# Patient Record
Sex: FEMALE | Race: WHITE | NOT HISPANIC OR LATINO | Employment: OTHER | URBAN - METROPOLITAN AREA
[De-identification: names, ages, dates, MRNs, and addresses within clinical notes are randomized per-mention and may not be internally consistent; named-entity substitution may affect disease eponyms.]

---

## 2017-05-01 ENCOUNTER — TRANSCRIBE ORDERS (OUTPATIENT)
Dept: LAB | Facility: CLINIC | Age: 67
End: 2017-05-01

## 2017-05-01 DIAGNOSIS — E78.2 MIXED HYPERLIPIDEMIA: Primary | ICD-10-CM

## 2017-05-01 DIAGNOSIS — E78.01 ESSENTIAL FAMILIAL HYPERCHOLESTEROLEMIA: ICD-10-CM

## 2017-08-22 ENCOUNTER — GENERIC CONVERSION - ENCOUNTER (OUTPATIENT)
Dept: OTHER | Facility: OTHER | Age: 67
End: 2017-08-22

## 2017-11-06 ENCOUNTER — ALLSCRIPTS OFFICE VISIT (OUTPATIENT)
Dept: OTHER | Facility: OTHER | Age: 67
End: 2017-11-06

## 2017-11-06 ENCOUNTER — LAB REQUISITION (OUTPATIENT)
Dept: LAB | Facility: HOSPITAL | Age: 67
End: 2017-11-06

## 2017-11-06 DIAGNOSIS — Z01.419 ENCOUNTER FOR GYNECOLOGICAL EXAMINATION WITHOUT ABNORMAL FINDING: ICD-10-CM

## 2017-11-06 PROCEDURE — G0145 SCR C/V CYTO,THINLAYER,RESCR: HCPCS | Performed by: OBSTETRICS & GYNECOLOGY

## 2017-11-07 NOTE — PROGRESS NOTES
Assessment  1  Visit for screening mammogram (V76 12) (Z12 31)   2  Encounter for routine gynecological examination (V72 31) (Z01 419)    Pelvic exam reveals uterus to be anterior mobile normal size and well supported  No ovarian masses are detected  The cervix is normal to appearance  There is no blood or discharge in the vagina  The vaginal mucosa is atrophic  The vulva is normal  Rectal exam shows no blood or masses in the rectum and no nodularity in the cul-de-sac  Breast exam is normal      Plan  Encounter for gynecological examination without abnormal finding    · (1) THIN PREP PAP WITH IMAGING; Status:Active - Retrospective Authorization; Requested QWX:[id number];    Perform:[address]; GDA:[id number]; Last Updated By:Andrew Maza; 11/6/2017 9:50:22 AM;Ordered;For:Encounter for gynecological examination without abnormal finding; Ordered By:Frida Boyce; Maturation index required? : No  :   HPV? : if ASCUS   · * MAMMO SCREENING BILATERAL W CAD; Status:Hold For - Scheduling; Requested  for:[id number]; Perform:[facility]; HIK:[id number];GKYPUUE;ZSZ:YSSHCGNFN for gynecological examination without abnormal finding; Ordered By:Frida Boyce;  Encounter for routine gynecological examination    · Follow-up visit in 2 months Evaluation and Treatment  Follow-up  Status: Hold For -  Scheduling  Requested for: [id number]   Ordered; For: Encounter for routine gynecological examination; Ordered By: Lucio Villalba Performed:  Due: [id number]    A Pap smear was taken at this visit  The patient was given a slip for bilateral screening mammogram to be performed after August 22, 2015  She will return in one year for follow-up GYN evaluation  Discussion/Summary    The patient was told that her breast and pelvic exam are normal  She will call she sees any vaginal bleeding over the next year        Chief Complaint  annual exam      History of Present Illness  HPI: This 19-year-old patient has had no vaginal bleeding or episodes of vaginitis over the past year  She occasionally gets sinus headaches  She has no migraine-type headaches  She has occasional hot flashes  She is in bed from 10:00 at night to 6 in the morning  She has normal bowel and bladder function  She did have a normal pelvic ultrasound in January 2014 which was done because of low back pain  This is resolved since then  She does frequently 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  GYN HM, Adult Female [place]:   General Health:   Lifestyle:  She does not use tobacco -- She denies alcohol use  -- She denies drug use  Reproductive health: the patient is postmenopausal--   she reports no menstrual problems  -- she uses no contraception  -- she is not sexually active  -- pregnancy history: G 2P 2,-- 2    Screening: Cervical cancer screening includes a pap smear performed 11/1/2016  Breast cancer screening includes a mammogram performed 8/22/2017  Colorectal cancer screening includes uncertain timing of the last colonoscopy  Review of Systems    Constitutional: No fever, no chills, feels well, no tiredness, no recent weight gain or loss  ENT: no ear ache, no loss of hearing, no nosebleeds or nasal discharge, no sore throat or hoarseness  Cardiovascular: no complaints of slow or fast heart rate, no chest pain, no palpitations, no leg claudication or lower extremity edema  Respiratory: no complaints of shortness of breath, no wheezing, no dyspnea on exertion, no orthopnea or PND  Breasts: no complaints of breast pain, breast lump or nipple discharge  Gastrointestinal: no complaints of abdominal pain, no constipation, no nausea or diarrhea, no vomiting, no bloody stools  Genitourinary: no complaints of dysuria, no incontinence, no pelvic pain, no dysmenorrhea, no vaginal discharge or abnormal vaginal bleeding     Musculoskeletal: no complaints of arthralgia, no myalgia, no joint swelling or stiffness, no limb pain or swelling  Integumentary: no complaints of skin rash or lesion, no itching or dry skin, no skin wounds  Neurological: no complaints of headache, no confusion, no numbness or tingling, no dizziness or fainting  Active Problems  1  Abdominal pain (789 00) (R10 9)   2  Abdominal pain, LLQ (left lower quadrant) (789 04) (R10 32)   3  Abdominal pain, RLQ (right lower quadrant) (789 03) (R10 31)   4  Encounter for gynecological examination without abnormal finding (V72 31) (Z01 419)   5  Encounter for routine gynecological examination (V72 31) (Z01 419)   6  Encounter for screening mammogram for malignant neoplasm of breast (V76 12)   (Z12 31)   7  Lower back pain (724 2) (M54 5)   8  Lumbago (724 2) (M54 5)   9  Osteopenia (733 90) (M85 80)   10  Visit for screening mammogram (V76 12) (Z12 31)    Past Medical History   · History of Asymptomatic menopausal state (V49 81) (Z78 0)   · History of Inability To Conceive - Female Infertility   · History of Mammogram   · History of Plantar fasciitis (728 71) (M72 2)    Surgical History   · History of  Section   · History of Dilation And Curettage   · History of Endometrial Biopsy By Suction   · History of Exploratory Laparoscopy   · History of Salpingectomy   · History of Shoulder Arthroscopy With Rotator Cuff Repair    Family History  Mother    · Family history of Hypertensive Heart Disease (V17 49)    Social History   · Marital History - Currently    · Never A Smoker    Current Meds   1  Metoprolol Succinate ER 25 MG Oral Tablet Extended Release 24 Hour; Therapy: [id number] to (Last Rx:96Wvy9812)  Requested for: 11Ldb3937 Ordered    Allergies  1   Sulfa Drugs    Vitals   Recorded: 01RLB4169 09:46AM Recorded: 00AGM0021 79:78MZ   Systolic 121 557   Diastolic 66 66   Height 5 ft 1 25 in 5 ft 1 25 in   Weight 140 lb  140 lb    BMI Calculated 26 24 26 24   BSA Calculated 1 63 1 63   LMP       Physical Exam    Constitutional General appearance: No acute distress, well appearing and well nourished  Neck   Neck: Normal, supple, trachea midline, no masses  Thyroid: Normal, no thyromegaly  Pulmonary   Respiratory effort: No increased work of breathing or signs of respiratory distress  Auscultation of lungs: Clear to auscultation  Cardiovascular   Auscultation of heart: Normal rate and rhythm, normal S1 and S2, no murmurs  Peripheral vascular exam: Normal pulses Throughout  Genitourinary   External genitalia: Normal and no lesions appreciated  Vagina: Normal, no lesions or dryness appreciated  Urethra: Normal     Urethral meatus: Normal     Bladder: Normal, soft, non-tender and no prolapse or masses appreciated  Cervix: Normal, no palpable masses  Uterus: Normal, non-tender, not enlarged, and no palpable masses  Adnexa/parametria: Normal, non-tender and no fullness or masses appreciated  Anus, perineum, and rectum: Normal sphincter tone, no masses, and no prolapse  Chest   Breasts: Normal and no dimpling or skin changes noted  Abdomen   Abdomen: Normal, non-tender, and no organomegaly noted  Liver and spleen: No hepatomegaly or splenomegaly  Examination for hernias: No hernias appreciated  Lymphatic   Palpation of lymph nodes in neck, axillae, groin and/or other locations: No lymphadenopathy or masses noted  Skin   Skin and subcutaneous tissue: Normal skin turgor and no rashes      Palpation of skin and subcutaneous tissue: Normal     Psychiatric   Orientation to person, place, and time: Normal     Mood and affect: Normal        Signatures   Electronically signed by : Deborah Taylor MD; Nov 6 2017 10:13AM EST                       (Author)

## 2017-11-14 LAB
LAB AP GYN PRIMARY INTERPRETATION: NORMAL
Lab: NORMAL

## 2017-11-20 ENCOUNTER — GENERIC CONVERSION - ENCOUNTER (OUTPATIENT)
Dept: OTHER | Facility: OTHER | Age: 67
End: 2017-11-20

## 2018-01-12 VITALS
DIASTOLIC BLOOD PRESSURE: 66 MMHG | BODY MASS INDEX: 26.43 KG/M2 | SYSTOLIC BLOOD PRESSURE: 118 MMHG | WEIGHT: 140 LBS | HEIGHT: 61 IN

## 2018-01-12 NOTE — RESULT NOTES
Verified Results  (B) PAP (REFLEX TO HPV PLUS WHEN ASC-US) [id number] 10:23AM Arik Charlton     Test Name Result Flag Reference   PAP, LIQUID-BASED NILM     DIAGNOSIS:            Negative for intraepithelial lesion or malignancy  ADEQUACY:             Satisfactory for evaluation /                         Endocervical/transformation zone component                         present  Transformation zone component cannot be                         adequately evaluated due to atrophic smear  COMMENT:              This Pap smear was screened with the assistance                         of the [employer] SharedPrep(TM) Imaging System and                         screened by a cytotechnologist   SPECIMEN SOURCE:      PAP (RFLX HPV PLUS WHENASC-US), CERVIX  CLINICAL INFORMATION: LMP: N/A                        Post menopausal                        Provided Diagnosis Codes: P97 706                                                Cervicovaginal cytology should be considered a                         screening procedure subject to false negatives                         and false positives  Results are more reliable                         when a satisfactory sample is obtained on a                         regular repetitive basis, and should be                         interpreted together with past and current                         clinical data    ELECTRONICALLY SIGNED   BY:                   Screened By: AYE Segura (ASCP)   Case                         Electronically Signed 11/03/2016

## 2018-01-18 NOTE — RESULT NOTES
Verified Results  (1) THIN PREP PAP WITH IMAGING [id number] 04:41PM Melissa Powell     Test Name Result Flag Reference   LAB AP CASE REPORT (Report)     Gynecologic Cytology Report            Case: [id number]                  Authorizing Provider: Hillary Amaro MD     Collected:      11/06/2017           First Screen:     AYE Selby    Received:      11/07/2017 1006        Rescreen:       Lawton Goldmann, CT                             Specimen:  LIQUID-BASED PAP, SCREENING, Cervix   LAB AP GYN PRIMARY INTERPRETATION      Negative for intraepithelial lesion or malignancy  Electronically signed by Lawton Goldmann, CT on 11/14/2017 at 4:41 PM   LAB AP GYN SPECIMEN ADEQUACY      Satisfactory for evaluation  (See note)   LAB AP GYN NOTE      No endocervical cells identified  It is difficult to differentiate between   squamous metaplastic cells and parabasal cells in atrophic specimens due   to numerous causes including menopause, postpartum changes and   progestational agents  LAB AP GYN ADDITIONAL INFORMATION (Report)     [facility]'s FDA approved ,  and ThinPrep Imaging System are   utilized with strict adherence to the 's instruction manual to   prepare gynecologic and non-gynecologic cytology specimens for the   production of ThinPrep slides as well as for gynecologic ThinPrep imaging  These processes have been validated by our laboratory and/or by the     The Pap test is not a diagnostic procedure and should not be used as the   sole means to detect cervical cancer  It is only a screening procedure to   aid in the detection of cervical cancer and its precursors  Both   false-negative and false-positive results have been experienced  Your   patient's test result should be interpreted in this context together with   the history and clinical findings     LAB AP LMP

## 2018-03-10 ENCOUNTER — HOSPITAL ENCOUNTER (EMERGENCY)
Facility: HOSPITAL | Age: 68
Discharge: HOME | End: 2018-03-10
Attending: EMERGENCY MEDICINE | Admitting: EMERGENCY MEDICINE
Payer: COMMERCIAL

## 2018-03-10 ENCOUNTER — APPOINTMENT (EMERGENCY)
Dept: RADIOLOGY | Facility: HOSPITAL | Age: 68
End: 2018-03-10
Payer: COMMERCIAL

## 2018-03-10 VITALS
OXYGEN SATURATION: 100 % | RESPIRATION RATE: 18 BRPM | HEART RATE: 68 BPM | BODY MASS INDEX: 26.86 KG/M2 | WEIGHT: 143.3 LBS | DIASTOLIC BLOOD PRESSURE: 80 MMHG | SYSTOLIC BLOOD PRESSURE: 175 MMHG | TEMPERATURE: 98.2 F

## 2018-03-10 DIAGNOSIS — T14.8XXA HEMATOMA: Primary | ICD-10-CM

## 2018-03-10 PROCEDURE — 99283 EMERGENCY DEPT VISIT LOW MDM: CPT

## 2018-03-10 PROCEDURE — 73090 X-RAY EXAM OF FOREARM: CPT

## 2018-03-11 NOTE — DISCHARGE INSTRUCTIONS
Hematoma    WHAT YOU NEED TO KNOW:    A hematoma is a collection of blood  A bruise is a type of hematoma  A hematoma may form in a muscle or in the tissues just under the skin  A hematoma that forms under the skin will feel like a bump or hard mass  Hematomas can happen anywhere in your body, including in your brain  Your body may break down and absorb a mild hematoma on its own  A more serious hematoma may need treatment  DISCHARGE INSTRUCTIONS:    Medicines: You may need any of the following:    Prescription pain medicine may be given  Ask how to take this medicine safely  NSAIDs , such as ibuprofen, help decrease swelling, pain, and fever  This medicine is available with or without a doctor's order  NSAIDs can cause stomach bleeding or kidney problems in certain people  If you take blood thinner medicine, always ask your healthcare provider if NSAIDs are safe for you  Always read the medicine label and follow directions  Antibiotics prevent or treat a bacterial infection  Take your medicine as directed  Call your healthcare provider if you think your medicine is not helping or if you have side effects  Tell him if you are allergic to any medicine  Keep a list of the medicines, vitamins, and herbs you take  Include the amounts, and when and why you take them  Bring the list or the pill bottles to follow-up visits  Carry your medicine list with you in case of an emergency  Return to the emergency department if:  You have new or worsening pain, or pain that does not get better with medicine  You have a fever  You have trouble moving the body part that has the hematoma  Contact your healthcare provider if:  You have questions or concerns about your condition or care  Follow up with your healthcare provider as directed: You may need to have surgery if your hematoma is severe  You may also need other tests to make sure there is no other damage that needs to be treated   Write down your questions so you remember to ask them during your visits  Self-care:  Rest the area  Rest will help your body heal and will also help prevent more damage  Apply ice as directed  Ice helps reduce swelling  Ice may also help prevent tissue damage  Use an ice pack, or put crushed ice in a bag  Cover it with a towel  Place it on your hematoma for 20 minutes every hour, or as directed  Ask how many times each day to apply ice, and for how many days  Compress the injury if possible  Lightly wrap the injury with an elastic or soft bandage  This may help control swelling  Ask your healthcare provider how to wrap your injury properly  Elevate the area as directed  If possible, raise the area above the level of your heart as often as you can  This will help decrease swelling  Keep the hematoma covered with a bandage  This will help protect the area while it heals  Contusion in Adults   WHAT YOU NEED TO KNOW:   A contusion is a bruise that appears on your skin after an injury  A bruise happens when small blood vessels tear but skin does not  When blood vessels tear, blood leaks into nearby tissue, such as soft tissue or muscle  DISCHARGE INSTRUCTIONS:   Return to the emergency department if:   · You have new trouble moving the injured area  · You have tingling or numbness in or near the injured area  · Your hand or foot below the bruise gets cold or turns pale  Contact your healthcare provider if:   · You find a new lump in the injured area  · Your symptoms do not improve with treatment after 4 to 5 days  · You have questions or concerns about your condition or care  Medicines: You may need any of the following:  · NSAIDs  help decrease swelling and pain or fever  This medicine is available with or without a doctor's order  NSAIDs can cause stomach bleeding or kidney problems in certain people  If you take blood thinner medicine, always ask your healthcare provider if NSAIDs are safe for you  Always read the medicine label and follow directions  · Prescription pain medicine  may be given  Do not wait until the pain is severe before you take your medicine  · Take your medicine as directed  Contact your healthcare provider if you think your medicine is not helping or if you have side effects  Tell him of her if you are allergic to any medicine  Keep a list of the medicines, vitamins, and herbs you take  Include the amounts, and when and why you take them  Bring the list or the pill bottles to follow-up visits  Carry your medicine list with you in case of an emergency  Follow up with your healthcare provider as directed: You may need to return within a week to check your injury again  Write down your questions so you remember to ask them during your visits  Help a contusion heal:   · Rest the injured area  or use it less than usual  If you bruised your leg or foot, you may need crutches or a cane to help you walk  This will help you keep weight off your injured body part  · Apply ice  to decrease swelling and pain  Ice may also help prevent tissue damage  Use an ice pack, or put crushed ice in a plastic bag  Cover it with a towel and place it on your bruise for 15 to 20 minutes every hour or as directed  · Use compression  to support the area and decrease swelling  Wrap an elastic bandage around the area over the bruised muscle  Make sure the bandage is not too tight  You should be able to fit 1 finger between the bandage and your skin  · Elevate (raise) your injured body part  above the level of your heart to help decrease pain and swelling  Use pillows, blankets, or rolled towels to elevate the area as often as you can  · Do not drink alcohol  as directed  Alcohol may slow healing  · Do not stretch injured muscles  right after your injury  Ask your healthcare provider when and how you may safely stretch after your injury   Gentle stretches can help increase your flexibility  · Do not massage the area or put heating pads  on the bruise right after your injury  Heat and massage may slow healing  Your healthcare provider may tell you to apply heat after several days  At that time, heat will start to help the injury heal   Prevent another contusion:   · Stretch and warm up before you play sports or exercise  · Wear protective gear when you play sports  Examples are shin guards and padding  · If you begin a new physical activity, start slowly to give your body a chance to adjust   © 2017 [address]  Information is for End User's use only and may not be sold, redistributed or otherwise used for commercial purposes  All illustrations and images included in CareNotes® are the copyrighted property of A D A M , Inc  or [employer]  The above information is an  only  It is not intended as medical advice for individual conditions or treatments  Talk to your doctor, nurse or pharmacist before following any medical regimen to see if it is safe and effective for you

## 2018-03-11 NOTE — ED PROVIDER NOTES
History  Chief Complaint   Patient presents with    Arm Injury     pt reprts falling this evening attempting to move a matress  pt reports hitting R arm on chair  denies LOC or hitting head  Pt repots taking 400mg ibuprofen PTA     70-year-old female presents with chief complaint of pain in her right forearm  Patient was moving a mattress earlier today when she hit her right forearm on a chair  Patient has some mild pain but is more concerned because she has some significant swelling at the site of injury  Patient has a superficial abrasion there as well  Patient states that her grandfather  from a hematoma that traveled to his heart  This is were concerned her because she clearly has an obvious hematoma to her right forearm  Patient does not take any blood thinners but did take 400 mg of ibuprofen prior to arrival         History provided by:  Patient   used: No    Arm Injury   Location:  Arm  Arm location:  R forearm  Injury: yes    Time since incident:  1 hour  Mechanism of injury comment:  Struck arm against right forearm  Pain details:     Quality:  Aching    Severity:  Mild    Onset quality:  Gradual    Timing:  Constant  Handedness:  Right-handed  Foreign body present:  No foreign bodies  Tetanus status:  Up to date  Prior injury to area:  No  Relieved by: Ice  Worsened by:  Nothing  Associated symptoms: no fever        None       Past Medical History:   Diagnosis Date    Mitral valve prolapse     "cardiologist reports not any more"       Past Surgical History:   Procedure Laterality Date    ROTATOR CUFF REPAIR Right        History reviewed  No pertinent family history  I have reviewed and agree with the history as documented  Social History   Substance Use Topics    Smoking status: Never Smoker    Smokeless tobacco: Not on file    Alcohol use No        Review of Systems   Constitutional: Negative for chills and fever     Musculoskeletal: Positive for arthralgias (right forearm)  Skin: Positive for wound (superficial clean abrasion)  Neurological: Negative for weakness and numbness  Physical Exam  ED Triage Vitals   Temperature Pulse Respirations Blood Pressure SpO2   03/10/18 2106 03/10/18 2102 03/10/18 2102 03/10/18 2102 03/10/18 2102   98 2 °F (36 8 °C) 68 18 (!) 175/80 100 %      Temp Source Heart Rate Source Patient Position - Orthostatic VS BP Location FiO2 (%)   03/10/18 2106 03/10/18 2102 03/10/18 2102 03/10/18 2102 --   Oral Monitor Lying Right arm       Pain Score       03/10/18 2102       2           Orthostatic Vital Signs  Vitals:    03/10/18 2102   BP: (!) 175/80   Pulse: 68   Patient Position - Orthostatic VS: Lying       Physical Exam   Constitutional: She is oriented to person, place, and time  She appears well-developed and well-nourished  She appears distressed (mild)  HENT:   Head: Normocephalic and atraumatic  Eyes: EOM are normal  Pupils are equal, round, and reactive to light  Neck: Normal range of motion  No JVD present  Cardiovascular: Normal rate, regular rhythm, normal heart sounds and intact distal pulses  Exam reveals no gallop and no friction rub  No murmur heard  Pulmonary/Chest: Effort normal and breath sounds normal  No respiratory distress  She has no wheezes  She has no rales  She exhibits no tenderness  Musculoskeletal: Normal range of motion  She exhibits edema and tenderness (right forearm)  Neurological: She is alert and oriented to person, place, and time  Skin: Skin is warm and dry  Psychiatric: She has a normal mood and affect  Her behavior is normal  Judgment and thought content normal    Nursing note and vitals reviewed  ED Medications  Medications - No data to display    Diagnostic Studies  Results Reviewed     None                 XR forearm 2 views RIGHT   ED Interpretation by Jez Simpson MD (03/10 2135)   This film was interpreted independently by me     Soft tissue swelling consistent with hematoma otw no fracture or dislocation  Procedures  Procedures       Phone Contacts  ED Phone Contact    ED Course  ED Course                                MDM  Number of Diagnoses or Management Options  Hematoma: new and requires workup  Diagnosis management comments: Background: 79 y o  female with right forearm pain after injury    Differential DX includes but is not limited to: fracture vs contusion vs sprain     Plan: imaging, symptom control         Amount and/or Complexity of Data Reviewed  Tests in the radiology section of CPT®: ordered and reviewed  Independent visualization of images, tracings, or specimens: yes      CritCare Time    Disposition  Final diagnoses:   Hematoma - acute right forearm     Time reflects when diagnosis was documented in both MDM as applicable and the Disposition within this note     Time User Action Codes Description Comment    3/10/2018  9:35 PM Faustino Silva  8XXA] Hematoma     3/10/2018  9:35 PM Eren Mendoza  8XXA] Hematoma acute right forearm      ED Disposition     ED Disposition Condition Comment    Discharge  Paralee Plater discharge to home/self care  Condition at discharge: Good        Follow-up Information    None       There are no discharge medications for this patient  No discharge procedures on file      ED Provider  Electronically Signed by           Lissett Corea MD  03/10/18 4236

## 2018-08-22 DIAGNOSIS — Z01.419 ENCOUNTER FOR GYNECOLOGICAL EXAMINATION WITHOUT ABNORMAL FINDING: ICD-10-CM

## 2018-11-12 ENCOUNTER — ANNUAL EXAM (OUTPATIENT)
Dept: OBGYN CLINIC | Facility: CLINIC | Age: 68
End: 2018-11-12
Payer: COMMERCIAL

## 2018-11-12 VITALS
SYSTOLIC BLOOD PRESSURE: 128 MMHG | HEIGHT: 61 IN | WEIGHT: 138 LBS | DIASTOLIC BLOOD PRESSURE: 76 MMHG | BODY MASS INDEX: 26.06 KG/M2

## 2018-11-12 DIAGNOSIS — Z12.31 ENCOUNTER FOR SCREENING MAMMOGRAM FOR MALIGNANT NEOPLASM OF BREAST: Primary | ICD-10-CM

## 2018-11-12 DIAGNOSIS — Z01.419 ENCOUNTER FOR GYNECOLOGICAL EXAMINATION (GENERAL) (ROUTINE) WITHOUT ABNORMAL FINDINGS: ICD-10-CM

## 2018-11-12 PROCEDURE — G0145 SCR C/V CYTO,THINLAYER,RESCR: HCPCS | Performed by: OBSTETRICS & GYNECOLOGY

## 2018-11-12 PROCEDURE — 99397 PER PM REEVAL EST PAT 65+ YR: CPT | Performed by: OBSTETRICS & GYNECOLOGY

## 2018-11-12 RX ORDER — METOPROLOL SUCCINATE 25 MG/1
25 TABLET, EXTENDED RELEASE ORAL
COMMUNITY
Start: 2018-07-24

## 2018-11-12 RX ORDER — AMPICILLIN TRIHYDRATE 250 MG
600 CAPSULE ORAL
COMMUNITY
Start: 2018-01-29

## 2018-11-12 RX ORDER — ASPIRIN 81 MG/1
81 TABLET, CHEWABLE ORAL
COMMUNITY
Start: 2018-08-02 | End: 2019-11-14

## 2018-11-12 NOTE — PATIENT INSTRUCTIONS
This 41-year-old patient was told that her breast and pelvic exam are normal  She will call if she sees any vaginal bleeding over the next year

## 2018-11-12 NOTE — PROGRESS NOTES
Assessment/Plan: This 72-year-old patient is seen today for annual gyn evaluation  She has no specific complaints at this time  No problem-specific Assessment & Plan notes found for this encounter  Subjective:      Patient ID: Jacklyn Aguilera is a 76 y o  female  This 72-year-old patient has had no vaginal bleeding or episodes of vaginitis over the past year  Vaginal intercourse is not occurring at this time  She has no hot flashes chronic fainting spells or headaches  She sleeps about 7 0 5 hours at night  Her bowel function is sometimes associated with bleeding due to hemorrhoids  She did have a normal colonoscopy within the past few years  She does not wear liners or pads for urine loss  She has had no urinary tract infections over the past year or kidney stones  She still baby-sits for 3 grandchildren on a regular basis and cooks for four  families  Gynecologic Exam           Review of Systems   Constitutional: Negative  HENT: Negative  Eyes: Negative  Respiratory: Negative  Cardiovascular: Negative  Gastrointestinal: Negative  Endocrine: Negative  Genitourinary: Negative  Musculoskeletal: Negative  Skin: Negative  Allergic/Immunologic: Negative  Neurological: Negative  Hematological: Negative  Psychiatric/Behavioral: Negative  Objective:      /76 (BP Location: Left arm, Patient Position: Sitting, Cuff Size: Standard)   Ht 5' 1" (1 549 m)   Wt 62 6 kg (138 lb)   BMI 26 07 kg/m²          Physical Exam   Constitutional: She is oriented to person, place, and time  She appears well-developed and well-nourished  HENT:   Head: Normocephalic  Neck: Normal range of motion  Neck supple  Cardiovascular: Normal rate, regular rhythm, normal heart sounds and intact distal pulses  Pulmonary/Chest: Effort normal and breath sounds normal    Abdominal: Soft   Bowel sounds are normal    Genitourinary: Uterus normal    Musculoskeletal: Normal range of motion  Neurological: She is alert and oriented to person, place, and time  Skin: Skin is warm and dry  Psychiatric: She has a normal mood and affect  Nursing note and vitals reviewed  breast exam is normal   Pelvic exam reveals uterus to be anterior well supported normal size  No adnexal masses are identified  There is no blood or discharge in the vagina  The vulva is normal  Rectal exam shows no blood or  masses in the rectum and no nodularity in the cul-de-sac

## 2018-11-19 LAB
LAB AP GYN PRIMARY INTERPRETATION: NORMAL
Lab: NORMAL

## 2019-08-07 PROBLEM — I83.893 SYMPTOMATIC VARICOSE VEINS OF BOTH LOWER EXTREMITIES: Status: ACTIVE | Noted: 2019-08-07

## 2019-08-07 NOTE — ASSESSMENT & PLAN NOTE
67yo female with PMH hypercholesterolemia, mitral valve prolapse, and symptomatic varicose veins presents to the Vascular office today for evaluation of bilateral symptomatic veins  Telangiectasias and spider veins to both legs; no swelling noted  Symptoms of heavy, tired, aching legs with burning to both legs occurs regularly  We discussed the pathophysiology of varicose veins and venous insufficiency  Patient expressed understanding  Will start with conservative measures to aid in symptom relief and progression of disease      PLAN:  -compression stockings 20-30mmHg Rx given, to be worn during waking hours and removed at bedtime  -elevate legs throughout the day as able  -exercise daily as tolerated  -continue weight loss and low-fat low-chol, low-sodium diet  -LEVDR in 3 months  -return for f/u with Vascular Surgeon after testing to discuss treatment plan  -if you have any questions or concerns, please call our office to discuss

## 2019-08-08 RX ORDER — PNV NO.95/FERROUS FUM/FOLIC AC 28MG-0.8MG
1 TABLET ORAL DAILY
COMMUNITY
Start: 2018-01-29

## 2019-08-12 ENCOUNTER — CONSULT (OUTPATIENT)
Dept: VASCULAR SURGERY | Facility: CLINIC | Age: 69
End: 2019-08-12
Payer: COMMERCIAL

## 2019-08-12 VITALS
TEMPERATURE: 97.8 F | DIASTOLIC BLOOD PRESSURE: 66 MMHG | HEIGHT: 61 IN | SYSTOLIC BLOOD PRESSURE: 110 MMHG | HEART RATE: 67 BPM | BODY MASS INDEX: 27.19 KG/M2 | WEIGHT: 144 LBS

## 2019-08-12 DIAGNOSIS — I87.2 VENOUS INSUFFICIENCY (CHRONIC) (PERIPHERAL): Primary | ICD-10-CM

## 2019-08-12 DIAGNOSIS — I83.893 SYMPTOMATIC VARICOSE VEINS OF BOTH LOWER EXTREMITIES: ICD-10-CM

## 2019-08-12 PROCEDURE — 99204 OFFICE O/P NEW MOD 45 MIN: CPT | Performed by: NURSE PRACTITIONER

## 2019-08-12 RX ORDER — MULTIVITAMIN
1 TABLET ORAL DAILY
COMMUNITY

## 2019-08-12 NOTE — PROGRESS NOTES
Assessment/Plan:    Symptomatic varicose veins of both lower extremities  65yo female with PMH hypercholesterolemia, mitral valve prolapse, and symptomatic varicose veins presents to the Vascular office today for evaluation of bilateral symptomatic veins  Telangiectasias and spider veins to both legs; no swelling noted  Symptoms of heavy, tired, aching legs with burning to both legs occurs regularly  We discussed the pathophysiology of varicose veins and venous insufficiency  Patient expressed understanding  Will start with conservative measures to aid in symptom relief and progression of disease  PLAN:  -compression stockings 20-30mmHg Rx given, to be worn during waking hours and removed at bedtime  -elevate legs throughout the day as able  -exercise daily as tolerated  -continue weight loss and low-fat low-chol, low-sodium diet  -LEVDR in 3 months  -return for f/u with Vascular Surgeon after testing to discuss treatment plan  -if you have any questions or concerns, please call our office to discuss         Diagnoses and all orders for this visit:    Venous insufficiency (chronic) (peripheral)  -     Compression Stocking  -     VAS reflux lower limb venous duplex study with reflux assessment, complete bilateral; Future    Symptomatic varicose veins of both lower extremities    Other orders  -     Multiple Vitamin (MULTIVITAMIN) tablet; Take 1 tablet by mouth daily          Subjective:      Patient ID: Teresa Adan is a 76 y o  female  Pt is new to our office  She is here today to have the varicose veins in her legs checked  She says that she first noticed the veins in her legs at age 39  She c/o aching, tiredness and heaviness in her legs  Pt does c/o of spider veins on bilateral legs  She denies any leg swelling, bleeding veins or any open wounds  She denies wearing compression stockings and exercise  She does elevate her legs  Pt takes ASA 81 mg every other day      Emilia Ray is a 65yo female with PMH hypercholesterolemia, mitral valve prolapse, and symptomatic varicose veins who presents to the Vascular office today for evaluation of bilateral symptomatic veins  She has telangiectasias and spider veins to both legs; no swelling noted  She reports symptoms of heavy, tired, aching legs with burning to both legs occurs regularly  This has been since age 39, worsening over the past few years  She noticed a "popping" sensation to the left posterior knee recently with aching  Went to Ortho but no diagnosis made  She denies any history of phlebitis, DVT/SVT, denies itching, wounds, ulcerations to the legs  Her grandmother had varicose veins, otherwise no significant FH  She is a non-smoker  She exercises regularly and elevates her legs at night  Not currently wearing compression stockings  The following portions of the patient's history were reviewed and updated as appropriate: allergies, current medications, past family history, past medical history, past social history, past surgical history and problem list     Review of Systems   Constitutional: Negative  HENT: Negative  Eyes: Negative  Respiratory: Negative  Cardiovascular: Negative  Gastrointestinal: Negative  Endocrine: Negative  Genitourinary: Negative  Musculoskeletal: Negative  Skin: Negative  Allergic/Immunologic: Negative  Neurological: Negative  Hematological: Bruises/bleeds easily  Psychiatric/Behavioral: Negative  Objective:      /66 (BP Location: Right arm, Patient Position: Sitting, Cuff Size: Standard)   Pulse 67   Temp 97 8 °F (36 6 °C) (Tympanic)   Ht 5' 1" (1 549 m)   Wt 65 3 kg (144 lb)   BMI 27 21 kg/m²          Physical Exam   Constitutional: She is oriented to person, place, and time  She appears well-developed and well-nourished  HENT:   Head: Normocephalic and atraumatic  Eyes: Pupils are equal, round, and reactive to light   EOM are normal  No scleral icterus  Neck: Normal range of motion  Cardiovascular: Normal rate, regular rhythm, normal heart sounds and intact distal pulses  Pulmonary/Chest: Effort normal and breath sounds normal    Abdominal: Soft  Bowel sounds are normal    Musculoskeletal: Normal range of motion  Neurological: She is alert and oriented to person, place, and time  She has normal strength  Skin: Skin is warm, dry and intact  Capillary refill takes less than 2 seconds  Psychiatric: She has a normal mood and affect  Her speech is normal and behavior is normal  Judgment and thought content normal  Cognition and memory are normal    Nursing note and vitals reviewed  I have reviewed and made appropriate changes to the review of systems input by the medical assistant  Vitals:    08/12/19 0859   BP: 110/66   BP Location: Right arm   Patient Position: Sitting   Cuff Size: Standard   Pulse: 67   Temp: 97 8 °F (36 6 °C)   TempSrc: Tympanic   Weight: 65 3 kg (144 lb)   Height: 5' 1" (1 549 m)       Patient Active Problem List   Diagnosis    Symptomatic varicose veins of both lower extremities    Venous insufficiency (chronic) (peripheral)       Past Surgical History:   Procedure Laterality Date    ROTATOR CUFF REPAIR Right        Family History   Problem Relation Age of Onset    Varicose Veins Maternal Grandmother        Social History     Socioeconomic History    Marital status: /Civil Union     Spouse name: Not on file    Number of children: Not on file    Years of education: Not on file    Highest education level: Not on file   Occupational History    Occupation: retired   Social Needs    Financial resource strain: Not on file    Food insecurity:     Worry: Not on file     Inability: Not on file   [employer] needs:     Medical: Not on file     Non-medical: Not on file   Tobacco Use    Smoking status: Never Smoker    Smokeless tobacco: Never Used   Substance and Sexual Activity    Alcohol use:  No  Drug use: No    Sexual activity: Never   Lifestyle    Physical activity:     Days per week: Not on file     Minutes per session: Not on file    Stress: Not on file   Relationships    Social connections:     Talks on phone: Not on file     Gets together: Not on file     Attends [employer] service: Not on file     Active member of club or organization: Not on file     Attends meetings of clubs or organizations: Not on file     Relationship status: Not on file    Intimate partner violence:     Fear of current or ex partner: Not on file     Emotionally abused: Not on file     Physically abused: Not on file     Forced sexual activity: Not on file   Other Topics Concern    Not on file   Social History Narrative    Not on file       Allergies   Allergen Reactions    Statins      DIZZY    Sulfa Antibiotics     Ezetimibe GI Intolerance         Current Outpatient Medications:     aspirin 81 mg chewable tablet, Chew 81 mg, Disp: , Rfl:     metoprolol succinate (TOPROL-XL) 25 mg 24 hr tablet, Take 25 mg by mouth, Disp: , Rfl:     Multiple Vitamin (MULTIVITAMIN) tablet, Take 1 tablet by mouth daily, Disp: , Rfl:     Omega-3 Fatty Acids (FISH OIL OMEGA-3) 1000 MG CAPS, Take 1 g by mouth daily, Disp: , Rfl:     Red Yeast Rice 600 MG CAPS, Take 600 mg by mouth, Disp: , Rfl:     metroNIDAZOLE (NORITRATE) 1 % cream, Apply topically, Disp: , Rfl:

## 2019-08-12 NOTE — PATIENT INSTRUCTIONS
We discussed the pathophysiology of varicose veins and venous insufficiency  Will start with conservative measures to aid in symptom relief and progression of disease  PLAN:  -compression stockings 20-30mmHg Rx given, to be worn during waking hours and removed at bedtime  -elevate legs throughout the day as able  -exercise daily as tolerated  -continue with healthy lifestyle including low-fat low-chol, low-sodium diet  -LEVDR in 3 months  -return for f/u with Vascular Surgeon after testing to discuss treatment plan  -if you have any questions or concerns, please call our office to discuss        Varicose Veins   AMBULATORY CARE:   Varicose veins  are veins that become large, twisted, and swollen  They are common on the back of the calves, knees, and thighs  Varicose veins are caused by valves in your veins that do not work properly  This causes blood to collect and increase pressure in the veins of your legs  The increased pressure causes your veins to stretch, get larger, swell, and twist        Common symptoms include the following: Your symptoms may be worse after you stand or sit for long periods of time  You may have any of the following:  · Blue, purple, or bulging veins in your legs     · Pain, swelling, or muscle cramps in your legs    · Feeling of fatigue or heaviness in your legs    · Cramping in your legs  Seek care immediately if:   · You have a wound that does not heal or is infected  · You have an injury that has broken your skin and caused your varicose veins to bleed  · Your leg is swollen and hard  · You notice that your legs or feet are turning blue or black  · Your leg feels warm, tender, and painful  It may look swollen and red  Contact your healthcare provider if:   · You have pain in your leg that does not go away or gets worse  · You notice sudden large bruising on your legs  · You have a rash on your leg       · Your symptoms keep you from doing your daily activities  · You have questions or concerns about your condition or care  Treatment of varicose veins  aims to decrease symptoms, improve appearance, and prevent further problems  Treatment will depend on which veins are affected and how severe your condition is  You may need procedures to treat or remove your varicose veins  For example, your healthcare provider may inject a solution or use a laser to close the varicose veins  Surgery to remove long veins may also be done  Ask your healthcare provider for more information about procedures used to treat varicose veins  Manage varicose veins:   · Do not sit or stand for long periods of time  This can cause the blood to collect in your legs and make your symptoms worse  Bend or rotate your ankles several times every hour  Walk around for a few minutes every hour to get blood moving in your legs  · Do not cross your legs when you sit  This decreases blood flow to your feet and can make your symptoms worse  · Do not wear tight clothing or shoes  Do not wear high-heeled shoes  Do not wear clothes that are tight around the waist or knees  · Maintain a healthy weight  Being overweight or obese can make your varicose veins worse  Ask your healthcare provider how much you should weigh  Ask him or her to help you create a weight loss plan if you are overweight  · Wear pressure stockings as directed  The stockings are tight and put pressure on your legs  They improve blood flow and help prevent clots  · Elevate your legs  Keep them above the level of your heart for 15 to 30 minutes several times a day  You can also prop the end of your bed up slightly to elevate your legs while you sleep  This will help blood to flow back to your heart  · Get regular exercise  Talk to your healthcare provider about the best exercise plan for you  Exercise can improve blood flow to your legs and feet    Follow up with your healthcare provider as directed: Write down your questions so you remember to ask them during your visits  © 2017 [employer] Information is for End User's use only and may not be sold, redistributed or otherwise used for commercial purposes  All illustrations and images included in CareNotes® are the copyrighted property of [employer]  or [employer]o  The above information is an  only  It is not intended as medical advice for individual conditions or treatments  Talk to your doctor, nurse or pharmacist before following any medical regimen to see if it is safe and effective for you  Venous Insufficiency   WHAT YOU NEED TO KNOW:   What is venous insufficiency? Venous insufficiency is a condition that prevents blood from flowing out of your legs and back to your heart  Veins contain valves that help blood flow in one direction  Venous insufficiency means the valves do not close correctly or fully  Blood flows back and pools in your leg  This can cause problems such as varicose veins  Venous insufficiency may also be called chronic venous insufficiency or venous stasis  What increases my risk for venous insufficiency? · A leg injury or blood clot    · Being a woman    · Pregnancy    · Older age    · A family history of varicose veins    · Smoking cigarettes    · Obesity, or not getting enough exercise  What are the signs and symptoms of venous insufficiency? · Visible veins on your legs that may be small and red or large, thick, and blue    · Swelling in your ankles or calves    · Changes in skin color, such as dark or purple skin    · An ulcer (open sore) on your leg    · Leg pain that is worse when you are menstruating (women) or when you stand, and better when you elevate your legs    · Burning or itching    · Cramps that happen at night    · Thick, hard skin on your legs and ankles    · Feeling of heaviness in your legs  How is venous insufficiency diagnosed?    · Venous duplex imaging  is a procedure used to examine the blood flow through veins  A gel will be applied to your legs  Your healthcare provider will slide a small device called a transducer across the veins  The transducer is a microphone that helps your healthcare provider hear blood moving through the vein  · Contrast venography  is a procedure used to show the veins on x-ray pictures  A catheter is guided into the vein  Contrast liquid is injected into the catheter to help the veins show up better in the pictures  Tell the healthcare provider if you have ever had an allergic reaction to contrast liquid  · Plethysmography  is a procedure that may be used to find changes in blood pressure through your veins  You will wear a blood pressure cuff on your leg  Changes in pressure and the amount of blood that can circulate through your leg veins are measured  Pressures are measured while you stand, sit, and lift your leg  How is venous insufficiency treated? · Medicine  may be given to improve blood flow  The medicines may thin your blood or reduce swelling to help blood flow  You may also need medicine to treat a bacterial infection  · Ablation  is a procedure used to close varicose veins  A catheter is guided until it is near the vein  A device will then be guided to the area  The device may produce energy through radiofrequency or a laser  The energy creates heat that will close the blood vessel  · Sclerotherapy  is a procedure used to fade visible veins  Your healthcare provider will inject a liquid into a spider vein or varicose vein  The liquid causes irritation in the vein  The vein swells and sticks together  Your body will then absorb the vein  · Surgery  may be needed if other treatments do not work  Surgery may be used to repair a leg vein valve or to clip or tie off a vein so blood cannot flow through it  You may need to have a veins removed during surgery called stripping   Surgery may be used to bypass (go around) the damaged vein  Blood will flow through a vein transplanted from another part of your body  What can I do to manage my symptoms? · Wear pressure stockings as directed  Pressure stockings help keep blood from pooling in your leg veins  Your healthcare provider can prescribe stockings that are right for you  Do not buy over-the-counter pressure stockings unless your healthcare provider says it is okay  They may not fit correctly or may have elastic that cuts off your circulation  Ask your healthcare provider when to start wearing pressure stockings and how long to wear them each day  · Do not sit or stand for long periods of time  If you have to sit for a long time, flex and extend your legs, feet, and ankles  Do this about 10 times every 30 minutes to help keep blood flowing  If you have to stand for a long time, take breaks and sit with your legs elevated  · Elevate your legs  Elevate your legs above the level of your heart to reduce swelling  Your healthcare provider may recommend that you keep your legs elevated for 30 minutes at a time  You may need to do this 3 to 4 times per day, or more if your healthcare provider recommends  · Do not smoke  Nicotine and other chemicals in cigarettes and cigars can cause blood vessel damage  Ask your healthcare provider for information if you currently smoke and need help to quit  E-cigarettes or smokeless tobacco still contain nicotine  Talk to your healthcare provider before you use these products  · Reach or maintain a healthy weight  Extra weight can make venous insufficiency worse  Ask your healthcare provider how much you should weigh  He can help you create a weight loss plan if you need to lose weight  · Exercise as directed  Walking can help increase blood flow in your calves  Ask your healthcare provider how much exercise you need each day and which exercises are best for you  · Care for your skin  Keep your skin clean  Do not use any soaps or lotions that may dry your skin  For example, do not use products that contain fragrance or alcohol  If you have a skin ulcer, your healthcare provider may recommend a wet-to-dry bandage  To do this, apply a wet bandage to your wound and allow it to dry  This will help remove drainage from your wound each time you change the bandage  Your healthcare provider will tell you how often to change your bandage and which kind of bandage to use  Check your wound for signs of infection, such as swelling or pus  · Go to physical therapy (PT) as directed  A physical therapist can help you increase movement and range of motion in your legs  When should I seek immediate care? · You have a wound that does not heal or is infected  · You have an injury that has broken your skin and caused your varicose veins to bleed  · Your leg is swollen and hard  · You have pain in your leg that does not go away or gets worse  · Your legs or feet are turning blue or black  · Your leg feels warm, tender, and painful  It may look swollen and red  When should I contact my healthcare provider? · You have a fever  · You have varicose veins and they are painful  · You have new or worsening leg pain, swelling, or redness  · You have new or worsening ulcers or other sores on your leg  · You have questions or concerns about your condition or care  CARE AGREEMENT:   You have the right to help plan your care  Learn about your health condition and how it may be treated  Discuss treatment options with your caregivers to decide what care you want to receive  You always have the right to refuse treatment  The above information is an  only  It is not intended as medical advice for individual conditions or treatments  Talk to your doctor, nurse or pharmacist before following any medical regimen to see if it is safe and effective for you    © 2017 [employer] Information is for End User's use only and may not be sold, redistributed or otherwise used for commercial purposes  All illustrations and images included in CareNotes® are the copyrighted property of [employer]  or [employer] Toro

## 2019-09-04 DIAGNOSIS — Z01.419 ENCOUNTER FOR GYNECOLOGICAL EXAMINATION WITHOUT ABNORMAL FINDING: ICD-10-CM

## 2019-11-12 ENCOUNTER — HOSPITAL ENCOUNTER (OUTPATIENT)
Dept: NON INVASIVE DIAGNOSTICS | Facility: CLINIC | Age: 69
Discharge: HOME | End: 2019-11-12
Payer: COMMERCIAL

## 2019-11-12 DIAGNOSIS — I87.2 VENOUS INSUFFICIENCY (CHRONIC) (PERIPHERAL): ICD-10-CM

## 2019-11-12 PROCEDURE — 93970 EXTREMITY STUDY: CPT

## 2019-11-12 PROCEDURE — 93970 EXTREMITY STUDY: CPT | Performed by: SURGERY

## 2019-11-14 ENCOUNTER — OFFICE VISIT (OUTPATIENT)
Dept: VASCULAR SURGERY | Facility: CLINIC | Age: 69
End: 2019-11-14
Payer: COMMERCIAL

## 2019-11-14 VITALS
RESPIRATION RATE: 14 BRPM | HEIGHT: 61 IN | BODY MASS INDEX: 27.19 KG/M2 | HEART RATE: 70 BPM | SYSTOLIC BLOOD PRESSURE: 140 MMHG | DIASTOLIC BLOOD PRESSURE: 80 MMHG | WEIGHT: 144 LBS

## 2019-11-14 DIAGNOSIS — I87.2 VENOUS INSUFFICIENCY (CHRONIC) (PERIPHERAL): Primary | ICD-10-CM

## 2019-11-14 DIAGNOSIS — I83.893 SYMPTOMATIC VARICOSE VEINS OF BOTH LOWER EXTREMITIES: ICD-10-CM

## 2019-11-14 PROCEDURE — 99214 OFFICE O/P EST MOD 30 MIN: CPT | Performed by: SURGERY

## 2019-11-14 NOTE — PROGRESS NOTES
Assessment/Plan:    Pt is 70 yo F w/ HTN, HLD, presents to discuss venous disease    Venous insufficiency (chronic) (peripheral)  Symptomatic varicose veins of both lower extremities  -aching of the B legs and cosmetic spiders  -reviewed LEVDR which shows deep reflux B only at the CFV level; all other deep and superificial veins are competent  -discussed the pathophysiology of venous disease; she is having relief with conservative measures and thus we will continue these including compression, elevation, exercise, healthy weight; discussed ideal BMI of <25 which would be 132lbs for her  -discussed sclero injections for cosmesis and risks and benefits; she would like to think about this; would likely need full session, 5 vials; worst clusters at medial knees, also R lateral knee and then scattered over lower legs  -f/u PRN or for injections        Subjective:      Patient ID: Bertha Colon is a 71 y o  female  Patient is here to review LEVDR done on 11/12/19  Pt c/o heaviness and tiredness in the legs  Pt has burning and aching discomfort  Right leg is worse than the left  Pt first noticed the spider veins about 8 years ago  Pt does wear compression stockings and elevates her legs daily  Pt is on ASA 81 mg  HPI:    Patient presents to discuss venous disease  Patient complains of BLE aching  This is worse at the end of the day  It is equal in both legs  This started about 7-8 yrs ago  She denies any hx of DVT  She had pregnancies but no venous symptoms associated with them  She has minimal swelling    Since last visit, patient has been wearing compression daily, elevating her legs sometimes, she is active with lots of walking  She has not been attempting weight loss  She notes that these measures have been helping with her symptoms significantly  She has trouble getting her stockings on because of arthritis in her fingers but can get each one on in under a minute  She is a never smoker  She is a health teacher  The following portions of the patient's history were reviewed and updated as appropriate: allergies, current medications, past family history, past medical history, past social history, past surgical history and problem list     Review of Systems   Constitutional: Negative  HENT: Negative  Eyes: Negative  Respiratory: Negative  Cardiovascular: Positive for leg swelling  Gastrointestinal: Negative  Endocrine: Negative  Genitourinary: Negative  Musculoskeletal: Positive for arthralgias (finger joints)  Negative for gait problem  Leg aching   Skin: Positive for color change (spider veins)  Negative for wound  Allergic/Immunologic: Negative  Neurological: Negative for dizziness, syncope and numbness  Hematological: Negative  Psychiatric/Behavioral: Negative  Objective:      /80 (BP Location: Left arm, Patient Position: Sitting)   Pulse 70   Resp 14   Ht 5' 1" (1 549 m)   Wt 65 3 kg (144 lb)   BMI 27 21 kg/m²          Physical Exam   Constitutional: She is oriented to person, place, and time  She appears well-developed and well-nourished  HENT:   Head: Normocephalic and atraumatic  Eyes: Conjunctivae are normal    Neck: Normal range of motion  Neck supple  Cardiovascular: Normal rate, regular rhythm and normal heart sounds  No murmur heard  Pulses:       Radial pulses are 2+ on the right side, and 2+ on the left side  Dorsalis pedis pulses are 0 on the right side, and 2+ on the left side  Posterior tibial pulses are 2+ on the right side, and 2+ on the left side  No carotid bruits B   Pulmonary/Chest: Effort normal and breath sounds normal  No respiratory distress  She has no wheezes  Abdominal: Soft  She exhibits no distension  There is no tenderness  There is no rebound  Musculoskeletal: Normal range of motion  She exhibits edema (very mild B legs)     Neurological: She is alert and oriented to person, place, and time  Skin: Skin is warm and dry  No chronic stasis changes  Clusters of spider veins mainly at the B medial knees and R lateral knee  Scattered spiders over B lower legs   Psychiatric: She has a normal mood and affect  Her behavior is normal    Nursing note and vitals reviewed  I have reviewed and made appropriate changes to the review of systems input by the medical assistant      Vitals:    11/14/19 0906   BP: 140/80   BP Location: Left arm   Patient Position: Sitting   Pulse: 70   Resp: 14   Weight: 65 3 kg (144 lb)   Height: 5' 1" (1 549 m)       Patient Active Problem List   Diagnosis    Symptomatic varicose veins of both lower extremities    Venous insufficiency (chronic) (peripheral)       Past Surgical History:   Procedure Laterality Date    ROTATOR CUFF REPAIR Right        Family History   Problem Relation Age of Onset    Varicose Veins Maternal Grandmother        Social History     Socioeconomic History    Marital status: /Civil Union     Spouse name: Not on file    Number of children: Not on file    Years of education: Not on file    Highest education level: Not on file   Occupational History    Occupation: retired   Social Needs    Financial resource strain: Not on file    Food insecurity:     Worry: Not on file     Inability: Not on file   drop.io needs:     Medical: Not on file     Non-medical: Not on file   Tobacco Use    Smoking status: Never Smoker    Smokeless tobacco: Never Used   Substance and Sexual Activity    Alcohol use: No    Drug use: No    Sexual activity: Never   Lifestyle    Physical activity:     Days per week: Not on file     Minutes per session: Not on file    Stress: Not on file   Relationships    Social connections:     Talks on phone: Not on file     Gets together: Not on file     Attends [employer] service: Not on file     Active member of club or organization: Not on file     Attends meetings of clubs or organizations: Not on file     Relationship status: Not on file    Intimate partner violence:     Fear of current or ex partner: Not on file     Emotionally abused: Not on file     Physically abused: Not on file     Forced sexual activity: Not on file   Other Topics Concern    Not on file   Social History Narrative    Not on file       Allergies   Allergen Reactions    Statins      DIZZY    Sulfa Antibiotics     Ezetimibe GI Intolerance         Current Outpatient Medications:     aspirin 81 mg chewable tablet, Chew 81 mg Every other day, Disp: , Rfl:     metoprolol succinate (TOPROL-XL) 25 mg 24 hr tablet, Take 25 mg by mouth, Disp: , Rfl:     metroNIDAZOLE (NORITRATE) 1 % cream, Apply topically, Disp: , Rfl:     Multiple Vitamin (MULTIVITAMIN) tablet, Take 1 tablet by mouth daily, Disp: , Rfl:     NON FORMULARY, Tumeric, Disp: , Rfl:     Omega-3 Fatty Acids (FISH OIL OMEGA-3) 1000 MG CAPS, Take 1 g by mouth daily, Disp: , Rfl:     Red Yeast Rice 600 MG CAPS, Take 600 mg by mouth, Disp: , Rfl:

## 2019-11-14 NOTE — PATIENT INSTRUCTIONS
1) Venous disease  -you have very mild venous insufficiency (leaky valves) on both legs  -we are going to continue conservative management with compression, elevation, exercise, healthy weight (132lbs is a healthy BMI for you)  -try [employer] for some good compression options; the ones I was wearing in the office are [employer] brand, 841C, soft opaque  -if you decide to do the sclero injections, please bring thigh high compression to the appointment    We cannot do the injections without them

## 2019-11-26 ENCOUNTER — ANNUAL EXAM (OUTPATIENT)
Dept: OBGYN CLINIC | Facility: CLINIC | Age: 69
End: 2019-11-26
Payer: COMMERCIAL

## 2019-11-26 VITALS
BODY MASS INDEX: 26.81 KG/M2 | WEIGHT: 142 LBS | SYSTOLIC BLOOD PRESSURE: 126 MMHG | DIASTOLIC BLOOD PRESSURE: 68 MMHG | HEIGHT: 61 IN

## 2019-11-26 DIAGNOSIS — Z01.419 ENCOUNTER FOR GYNECOLOGICAL EXAMINATION (GENERAL) (ROUTINE) WITHOUT ABNORMAL FINDINGS: ICD-10-CM

## 2019-11-26 DIAGNOSIS — Z12.31 ENCOUNTER FOR SCREENING MAMMOGRAM FOR MALIGNANT NEOPLASM OF BREAST: Primary | ICD-10-CM

## 2019-11-26 PROCEDURE — G0145 SCR C/V CYTO,THINLAYER,RESCR: HCPCS | Performed by: OBSTETRICS & GYNECOLOGY

## 2019-11-26 PROCEDURE — 99397 PER PM REEVAL EST PAT 65+ YR: CPT | Performed by: OBSTETRICS & GYNECOLOGY

## 2019-11-26 NOTE — PROGRESS NOTES
Assessment/Plan: This 61-year-old patient is seen in gyn evaluation this time  She occasionally sees blood with hemorrhoids  No problem-specific Assessment & Plan notes found for this encounter  Subjective:      Patient ID: Cash Joshi is a 71 y o  female  This 61-year-old patient has had no vaginal bleeding or episodes of vaginitis over the past year  [place] is not an issue at this time  The case has have with she does not wear pads or liners routinely for urine stress incontinence  She has had no urinary tract infections over the past year  She is in bed about 8 hours at night  She does wake up every 2 hours or so but then falls back to sleep most of the time  She has occasional hot flashes but no chronic headaches or fainting spells  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 and helps take care 3 grandchildren  Her weight is 142 lb and blood pressure 126/68        Review of Systems   Constitutional: Negative  HENT: Negative  Eyes: Negative  Respiratory: Negative  Cardiovascular: Negative  Gastrointestinal: Positive for blood in stool  Endocrine: Negative  Genitourinary: Negative  Musculoskeletal: Negative  Skin: Negative  Allergic/Immunologic: Negative  Neurological: Negative  Hematological: Negative  Psychiatric/Behavioral: Negative  Objective:      /68 (BP Location: Left arm, Patient Position: Sitting, Cuff Size: Standard)   Ht 5' 1" (1 549 m)   Wt 64 4 kg (142 lb)   BMI 26 83 kg/m²          Physical Exam   Constitutional: She is oriented to person, place, and time  She appears well-developed and well-nourished  HENT:   Head: Normocephalic  Neck: Normal range of motion  Neck supple  Cardiovascular: Normal rate, regular rhythm, normal heart sounds and intact distal pulses  Pulmonary/Chest: Effort normal and breath sounds normal    Abdominal: Soft   Bowel sounds are normal    Genitourinary: Uterus normal    Musculoskeletal: Normal range of motion  Neurological: She is alert and oriented to person, place, and time  Skin: Skin is warm and dry  Psychiatric: She has a normal mood and affect  Nursing note and vitals reviewed  breast exam is normal   Pelvic exam reveals uterus to be anterior mobile normal size  No adnexal masses are present  The cervix is normal   There is no blood or discharge in the vagina  The vulva is normal   Rectal exam reveals no masses or blood in the rectum and no nodularity in the cul-de-sac

## 2019-12-02 LAB
LAB AP GYN PRIMARY INTERPRETATION: NORMAL
Lab: NORMAL

## 2020-09-10 DIAGNOSIS — Z12.31 ENCOUNTER FOR SCREENING MAMMOGRAM FOR MALIGNANT NEOPLASM OF BREAST: ICD-10-CM

## 2020-12-01 ENCOUNTER — ANNUAL EXAM (OUTPATIENT)
Dept: OBGYN CLINIC | Facility: CLINIC | Age: 70
End: 2020-12-01
Payer: COMMERCIAL

## 2020-12-01 VITALS
DIASTOLIC BLOOD PRESSURE: 70 MMHG | HEIGHT: 61 IN | WEIGHT: 139 LBS | SYSTOLIC BLOOD PRESSURE: 110 MMHG | BODY MASS INDEX: 26.24 KG/M2

## 2020-12-01 DIAGNOSIS — Z01.419 ENCNTR FOR GYN EXAM (GENERAL) (ROUTINE) W/O ABN FINDINGS: Primary | ICD-10-CM

## 2020-12-01 DIAGNOSIS — Z12.31 ENCOUNTER FOR SCREENING MAMMOGRAM FOR MALIGNANT NEOPLASM OF BREAST: ICD-10-CM

## 2020-12-01 PROCEDURE — 99397 PER PM REEVAL EST PAT 65+ YR: CPT | Performed by: OBSTETRICS & GYNECOLOGY

## 2020-12-02 LAB
CYTOLOGIST CVX/VAG CYTO: NORMAL
DX ICD CODE: NORMAL
OTHER STN SPEC: NORMAL
OTHER STN SPEC: NORMAL
PATH REPORT.FINAL DX SPEC: NORMAL
SL AMB NOTE:: NORMAL
SL AMB SPECIMEN ADEQUACY: NORMAL
SL AMB TEST METHODOLOGY: NORMAL

## 2021-07-24 ENCOUNTER — HOSPITAL ENCOUNTER (OUTPATIENT)
Facility: HOSPITAL | Age: 71
Setting detail: OBSERVATION
Discharge: HOME | End: 2021-07-25
Attending: EMERGENCY MEDICINE | Admitting: INTERNAL MEDICINE
Payer: COMMERCIAL

## 2021-07-24 ENCOUNTER — APPOINTMENT (EMERGENCY)
Dept: RADIOLOGY | Facility: HOSPITAL | Age: 71
End: 2021-07-24
Payer: COMMERCIAL

## 2021-07-24 DIAGNOSIS — R07.9 CHEST PAIN: ICD-10-CM

## 2021-07-24 DIAGNOSIS — R11.0 NAUSEA: Primary | ICD-10-CM

## 2021-07-24 LAB
ALBUMIN SERPL BCP-MCNC: 4 G/DL (ref 3.5–5)
ALP SERPL-CCNC: 106 U/L (ref 46–116)
ALT SERPL W P-5'-P-CCNC: 47 U/L (ref 12–78)
ANION GAP SERPL CALCULATED.3IONS-SCNC: 7 MMOL/L (ref 4–13)
AST SERPL W P-5'-P-CCNC: 24 U/L (ref 5–45)
BASOPHILS # BLD AUTO: 0.08 THOUSANDS/ΜL (ref 0–0.1)
BASOPHILS NFR BLD AUTO: 1 % (ref 0–1)
BILIRUB SERPL-MCNC: 0.25 MG/DL (ref 0.2–1)
BUN SERPL-MCNC: 28 MG/DL (ref 5–25)
CALCIUM SERPL-MCNC: 9 MG/DL (ref 8.3–10.1)
CHLORIDE SERPL-SCNC: 103 MMOL/L (ref 100–108)
CO2 SERPL-SCNC: 30 MMOL/L (ref 21–32)
CREAT SERPL-MCNC: 1.07 MG/DL (ref 0.6–1.3)
EOSINOPHIL # BLD AUTO: 0.21 THOUSAND/ΜL (ref 0–0.61)
EOSINOPHIL NFR BLD AUTO: 3 % (ref 0–6)
ERYTHROCYTE [DISTWIDTH] IN BLOOD BY AUTOMATED COUNT: 12.6 % (ref 11.6–15.1)
GFR SERPL CREATININE-BSD FRML MDRD: 53 ML/MIN/1.73SQ M
GLUCOSE SERPL-MCNC: 96 MG/DL (ref 65–140)
HCT VFR BLD AUTO: 39.8 % (ref 34.8–46.1)
HGB BLD-MCNC: 13.6 G/DL (ref 11.5–15.4)
IMM GRANULOCYTES # BLD AUTO: 0.02 THOUSAND/UL (ref 0–0.2)
IMM GRANULOCYTES NFR BLD AUTO: 0 % (ref 0–2)
LYMPHOCYTES # BLD AUTO: 3.64 THOUSANDS/ΜL (ref 0.6–4.47)
LYMPHOCYTES NFR BLD AUTO: 51 % (ref 14–44)
MCH RBC QN AUTO: 31.7 PG (ref 26.8–34.3)
MCHC RBC AUTO-ENTMCNC: 34.2 G/DL (ref 31.4–37.4)
MCV RBC AUTO: 93 FL (ref 82–98)
MONOCYTES # BLD AUTO: 0.82 THOUSAND/ΜL (ref 0.17–1.22)
MONOCYTES NFR BLD AUTO: 11 % (ref 4–12)
NEUTROPHILS # BLD AUTO: 2.44 THOUSANDS/ΜL (ref 1.85–7.62)
NEUTS SEG NFR BLD AUTO: 34 % (ref 43–75)
NRBC BLD AUTO-RTO: 0 /100 WBCS
PLATELET # BLD AUTO: 303 THOUSANDS/UL (ref 149–390)
PMV BLD AUTO: 9.6 FL (ref 8.9–12.7)
POTASSIUM SERPL-SCNC: 3.9 MMOL/L (ref 3.5–5.3)
PROT SERPL-MCNC: 7.5 G/DL (ref 6.4–8.2)
RBC # BLD AUTO: 4.29 MILLION/UL (ref 3.81–5.12)
SODIUM SERPL-SCNC: 140 MMOL/L (ref 136–145)
TROPONIN I SERPL-MCNC: <0.02 NG/ML
WBC # BLD AUTO: 7.21 THOUSAND/UL (ref 4.31–10.16)

## 2021-07-24 PROCEDURE — 80053 COMPREHEN METABOLIC PANEL: CPT | Performed by: EMERGENCY MEDICINE

## 2021-07-24 PROCEDURE — 84484 ASSAY OF TROPONIN QUANT: CPT | Performed by: EMERGENCY MEDICINE

## 2021-07-24 PROCEDURE — 99285 EMERGENCY DEPT VISIT HI MDM: CPT | Performed by: EMERGENCY MEDICINE

## 2021-07-24 PROCEDURE — 85025 COMPLETE CBC W/AUTO DIFF WBC: CPT | Performed by: EMERGENCY MEDICINE

## 2021-07-24 PROCEDURE — 71046 X-RAY EXAM CHEST 2 VIEWS: CPT

## 2021-07-24 PROCEDURE — 36415 COLL VENOUS BLD VENIPUNCTURE: CPT | Performed by: EMERGENCY MEDICINE

## 2021-07-24 PROCEDURE — 99285 EMERGENCY DEPT VISIT HI MDM: CPT

## 2021-07-24 PROCEDURE — 93005 ELECTROCARDIOGRAM TRACING: CPT

## 2021-07-24 RX ORDER — ONDANSETRON 2 MG/ML
4 INJECTION INTRAMUSCULAR; INTRAVENOUS ONCE
Status: DISCONTINUED | OUTPATIENT
Start: 2021-07-24 | End: 2021-07-25 | Stop reason: HOSPADM

## 2021-07-24 RX ADMIN — SODIUM CHLORIDE 1000 ML: 0.9 INJECTION, SOLUTION INTRAVENOUS at 23:26

## 2021-07-24 NOTE — Clinical Note
Case was discussed with SAMINA and the patient's admission status was agreed to be Admission Status: observation status to the service of Dr Parvin Guzman

## 2021-07-25 VITALS
HEART RATE: 60 BPM | WEIGHT: 139 LBS | SYSTOLIC BLOOD PRESSURE: 125 MMHG | RESPIRATION RATE: 18 BRPM | OXYGEN SATURATION: 98 % | HEIGHT: 61 IN | DIASTOLIC BLOOD PRESSURE: 61 MMHG | TEMPERATURE: 98.1 F | BODY MASS INDEX: 26.24 KG/M2

## 2021-07-25 PROBLEM — I34.1 MITRAL VALVE PROLAPSE: Status: ACTIVE | Noted: 2021-07-25

## 2021-07-25 PROBLEM — I10 HTN (HYPERTENSION): Status: ACTIVE | Noted: 2021-07-25

## 2021-07-25 PROBLEM — R07.9 CHEST PAIN: Status: ACTIVE | Noted: 2021-07-25

## 2021-07-25 PROBLEM — E78.5 HLD (HYPERLIPIDEMIA): Status: ACTIVE | Noted: 2021-07-25

## 2021-07-25 PROBLEM — E78.01 FAMILIAL HYPERCHOLESTEROLEMIA: Status: ACTIVE | Noted: 2021-07-25

## 2021-07-25 LAB
ANION GAP SERPL CALCULATED.3IONS-SCNC: 11 MMOL/L (ref 4–13)
ATRIAL RATE: 58 BPM
ATRIAL RATE: 58 BPM
ATRIAL RATE: 66 BPM
BUN SERPL-MCNC: 21 MG/DL (ref 5–25)
CALCIUM SERPL-MCNC: 8.4 MG/DL (ref 8.3–10.1)
CHLORIDE SERPL-SCNC: 108 MMOL/L (ref 100–108)
CO2 SERPL-SCNC: 24 MMOL/L (ref 21–32)
CREAT SERPL-MCNC: 0.89 MG/DL (ref 0.6–1.3)
GFR SERPL CREATININE-BSD FRML MDRD: 66 ML/MIN/1.73SQ M
GLUCOSE P FAST SERPL-MCNC: 99 MG/DL (ref 65–99)
GLUCOSE SERPL-MCNC: 99 MG/DL (ref 65–140)
P AXIS: 62 DEGREES
P AXIS: 68 DEGREES
P AXIS: 69 DEGREES
POTASSIUM SERPL-SCNC: 3.8 MMOL/L (ref 3.5–5.3)
PR INTERVAL: 168 MS
PR INTERVAL: 170 MS
PR INTERVAL: 174 MS
QRS AXIS: 13 DEGREES
QRS AXIS: 16 DEGREES
QRS AXIS: 8 DEGREES
QRSD INTERVAL: 66 MS
QRSD INTERVAL: 70 MS
QRSD INTERVAL: 74 MS
QT INTERVAL: 410 MS
QT INTERVAL: 418 MS
QT INTERVAL: 428 MS
QTC INTERVAL: 410 MS
QTC INTERVAL: 420 MS
QTC INTERVAL: 429 MS
SODIUM SERPL-SCNC: 143 MMOL/L (ref 136–145)
T WAVE AXIS: -17 DEGREES
T WAVE AXIS: 0 DEGREES
T WAVE AXIS: 25 DEGREES
TROPONIN I SERPL-MCNC: <0.02 NG/ML
VENTRICULAR RATE: 58 BPM
VENTRICULAR RATE: 58 BPM
VENTRICULAR RATE: 66 BPM

## 2021-07-25 PROCEDURE — 99236 HOSP IP/OBS SAME DATE HI 85: CPT | Performed by: INTERNAL MEDICINE

## 2021-07-25 PROCEDURE — 93005 ELECTROCARDIOGRAM TRACING: CPT

## 2021-07-25 PROCEDURE — 84484 ASSAY OF TROPONIN QUANT: CPT | Performed by: INTERNAL MEDICINE

## 2021-07-25 PROCEDURE — 93010 ELECTROCARDIOGRAM REPORT: CPT | Performed by: INTERNAL MEDICINE

## 2021-07-25 PROCEDURE — 36415 COLL VENOUS BLD VENIPUNCTURE: CPT | Performed by: INTERNAL MEDICINE

## 2021-07-25 PROCEDURE — 80048 BASIC METABOLIC PNL TOTAL CA: CPT | Performed by: INTERNAL MEDICINE

## 2021-07-25 RX ORDER — HYDRALAZINE HYDROCHLORIDE 20 MG/ML
10 INJECTION INTRAMUSCULAR; INTRAVENOUS EVERY 6 HOURS PRN
Status: DISCONTINUED | OUTPATIENT
Start: 2021-07-25 | End: 2021-07-25 | Stop reason: HOSPADM

## 2021-07-25 RX ORDER — METOPROLOL SUCCINATE 50 MG/1
25 TABLET, EXTENDED RELEASE ORAL DAILY
Status: DISCONTINUED | OUTPATIENT
Start: 2021-07-25 | End: 2021-07-25 | Stop reason: HOSPADM

## 2021-07-25 RX ORDER — ASPIRIN 81 MG/1
81 TABLET, CHEWABLE ORAL DAILY
Status: DISCONTINUED | OUTPATIENT
Start: 2021-07-25 | End: 2021-07-25 | Stop reason: HOSPADM

## 2021-07-25 RX ORDER — HYDRALAZINE HYDROCHLORIDE 20 MG/ML
5 INJECTION INTRAMUSCULAR; INTRAVENOUS EVERY 6 HOURS PRN
Status: DISCONTINUED | OUTPATIENT
Start: 2021-07-25 | End: 2021-07-25

## 2021-07-25 RX ADMIN — ASPIRIN 81 MG: 81 TABLET, CHEWABLE ORAL at 08:11

## 2021-07-25 RX ADMIN — ENOXAPARIN SODIUM 40 MG: 40 INJECTION SUBCUTANEOUS at 08:13

## 2021-07-25 RX ADMIN — METOPROLOL SUCCINATE 25 MG: 50 TABLET, EXTENDED RELEASE ORAL at 08:11

## 2021-07-25 NOTE — DISCHARGE INSTR - AVS FIRST PAGE
Dear Pa Braun,     It was our pleasure to care for you here at [facility], [address]  It is our hope that we were always able to exceed the expected standards for your care during your stay  You were hospitalized due to chest pain  You were cared for on the ER by Will Bueno MD under the service of [id number] [facility] MD [place] with the [facility] who covers for your primary care physician (PCP), Maryan Hanson MD, while you were hospitalized  If you have any questions or concerns related to this hospitalization, you may contact us at [phone]  For follow up as well as any medication refills, we recommend that you follow up with your primary care physician  A registered nurse will reach out to you by phone within a few days after your discharge to answer any additional questions that you may have after going home  However, at this time we provide for you here, the most important instructions / recommendations at discharge:     · Notable Medication Adjustments -   · Take your medication as before   · Testing Required after Discharge -   · None   · Important follow up information -   · Please follow up with your cardiologist and your primary care physician   · Other Instructions -   · Please review this entire after visit summary as additional general instructions including medication list, appointments, activity, diet, any pertinent wound care, and other additional recommendations from your care team that may be provided for you        Sincerely,     Will Bueno MD

## 2021-07-25 NOTE — ASSESSMENT & PLAN NOTE
· Presented to the ED with blood pressure in the 180s / 80s, normalized since admission  · Continue home medication, metoprolol  · Monitor blood pressure at home, follow up through PCP

## 2021-07-25 NOTE — ASSESSMENT & PLAN NOTE
· Patient reports history of mitral valve prolapse  · Review of echo in 2017 shows an EF of 60% and structurally normal mitral valve without significant stenosis or      regurgitation

## 2021-07-25 NOTE — ASSESSMENT & PLAN NOTE
· Presented to the ED with blood pressure in the 180s / 80s  · Patient reports that blood pressures are usually in the 120s over 80s    Plan:  · Continue home medication, metoprolol  · P r n   Hydralazine for SBP greater than 180

## 2021-07-25 NOTE — ASSESSMENT & PLAN NOTE
· Follows with cardiology  · Patient takes fish oil and red yeast rice  · Patient has not tolerated statins  in the past

## 2021-07-25 NOTE — DISCHARGE INSTRUCTIONS
Chest Pain   WHAT YOU NEED TO KNOW:   Chest pain can be caused by a range of conditions, from not serious to life-threatening  Chest pain can be a symptom of a digestive problem, such as acid reflux or a stomach ulcer  An anxiety attack or a strong emotion, such as anger, can also cause chest pain  Infection, inflammation, or a fracture in the bones or cartilage in your chest can cause pain or discomfort  Sometimes chest pain or pressure is caused by poor blood flow to your heart (angina)  Chest pain may also be caused by life-threatening conditions such as a heart attack or blood clot in your lungs  DISCHARGE INSTRUCTIONS:   Call your local emergency number (911 in the [address] [facility]) or have someone call if:   · You have any of the following signs of a heart attack:      ? Squeezing, pressure, or pain in your chest    ? You may  also have any of the following:     § Discomfort or pain in your back, neck, jaw, stomach, or arm    § Shortness of breath    § Nausea or vomiting    § Lightheadedness or a sudden cold sweat      Return to the emergency department if:   · You have chest discomfort that gets worse, even with medicine  · You cough or vomit blood  · Your bowel movements are black or bloody  · You cannot stop vomiting, or it hurts to swallow  Call your doctor if:   · You have questions or concerns about your condition or care  Medicines:   · Medicines  may be given to treat the cause of your chest pain  Examples include pain medicine, anxiety medicine, or medicines to increase blood flow to your heart  · Do not take certain medicines without asking your healthcare provider first   These include NSAIDs, herbal or vitamin supplements, or hormones (estrogen or progestin)  · Take your medicine as directed  Contact your healthcare provider if you think your medicine is not helping or if you have side effects  Tell him or her if you are allergic to any medicine   Keep a list of the medicines, vitamins, and herbs you take  Include the amounts, and when and why you take them  Bring the list or the pill bottles to follow-up visits  Carry your medicine list with you in case of an emergency  Healthy living tips: The following are general healthy guidelines  If the cause of your chest pain is known, your healthcare provider will give you specific guidelines to follow  · Do not smoke  Nicotine and other chemicals in cigarettes and cigars can cause lung and heart damage  Ask your healthcare provider for information if you currently smoke and need help to quit  E-cigarettes or smokeless tobacco still contain nicotine  Talk to your healthcare provider before you use these products  · Choose a variety of healthy foods as often as possible  Include fresh, frozen, or canned fruits and vegetables  Also include low-fat dairy products, fish, chicken (without skin), and lean meats  Your healthcare provider or a dietitian can help you create meal plans  You may need to avoid certain foods or drinks if your pain is caused by a digestion problem  · Lower your sodium (salt) intake  Limit foods that are high in sodium, such as canned foods, salty snacks, and cold cuts  If you add salt when you cook food, do not add more at the table  Choose low-sodium canned foods as much as possible  · Drink plenty of water every day  Water helps your body to control your temperature and blood pressure  Ask your healthcare provider how much water you should drink every day  · Ask about activity  Your healthcare provider will tell you which activities to limit or avoid  Ask when you can drive, return to work, and have sex  Ask about the best exercise plan for you  · Maintain a healthy weight  Ask your healthcare provider what a healthy weight is for you  Ask him or her to help you create a safe weight loss plan if you are overweight  · Ask about vaccines you may need    Get the influenza (flu) vaccine every year as soon as recommended, usually in September or October  You may also need a pneumococcal vaccine to prevent pneumonia  The vaccine is usually given every 5 years, starting at age 72  Your healthcare provider can tell you if should get other vaccines, and when to get them  Follow up with your healthcare provider within 72 hours, or as directed: You may need to return for more tests to find the cause of your chest pain  You may be referred to a specialist, such as a cardiologist or gastroenterologist  Write down your questions so you remember to ask them during your visits  © Copyright [employer] 2021 Information is for End User's use only and may not be sold, redistributed or otherwise used for commercial purposes  All illustrations and images included in CareNotes® are the copyrighted property of [employer]  or Bruna Archibald  The above information is an  only  It is not intended as medical advice for individual conditions or treatments  Talk to your doctor, nurse or pharmacist before following any medical regimen to see if it is safe and effective for you

## 2021-07-25 NOTE — ASSESSMENT & PLAN NOTE
Patient with a history of familial hyperlipidemia presents to the ED with intermittent left-sided chest discomfort associated with shortness of Breath and nausea  ED:  · Hypertensive:  187/84  · Troponin x1 negative  · CBC and CMP unremarkable  · On personal read, do not see any acute cardiopulmonary issues on chest x-ray  · EKG:  Normal sinus rhythm, pending official read  · SAMSON score:  2    Plan:  · Admitted for observation  · Trend troponin  · Monitor on telemetry  · Continue aspirin and metoprolol  · P r n   Hydralazine for SBP greater than 180

## 2021-07-25 NOTE — DISCHARGE SUMMARY
[facility]  Discharge- Shauna Canas 1950, 79 y o  female MRN: [id number]  Unit/Bed#: ED 27 Encounter: [id number]  Primary Care Provider: Kenan Gomez MD   Date and time admitted to hospital: 7/24/2021 10:25 PM    Chest pain  Assessment & Plan  Patient with a history of familial hyperlipidemia presents to the ED with intermittent left-sided chest discomfort  Pain without radiation, duration of a few seconds, reproducible  Physical exam revealed left parasternal pain at palpation  Troponin negative x4, CBC, CMP unremarkable  Chest x-ray unremarkable  EKG showed sinus bradycardia, low voltage, right bundle branch block incomplete  Telemetry showed rare PVCs  Most likely musculoskeletal  Stable for discharge  Follow up with Cardiology as outpatient    HTN (hypertension)  Assessment & Plan  · Presented to the ED with blood pressure in the 180s / 80s, normalized since admission  · Continue home medication, metoprolol  · Monitor blood pressure at home, follow up through PCP    Familial hypercholesterolemia  Assessment & Plan  · Follows with cardiology  · Patient takes fish oil and red yeast rice  · Patient has not tolerated statins  in the past        Medical Problems     Resolved Problems  Date Reviewed: 12/1/2020    None              Discharging Resident: Eleno Gosselin, MD  Discharging Attending: No att  providers found  PCP: Kenan Gomez MD  Admission Date:   Admission Orders (From admission, onward)     Ordered        07/24/21 2346  Place in Observation  Once                   Discharge Date: 07/25/21    Consultations During Hospital Stay:  ·     Procedures Performed:   · No procedures    Significant Findings / Test Results:   · None    Incidental Findings:   · None    Test Results Pending at Discharge (will require follow up):   · None     Outpatient Tests Requested:  · None    Complications:  No complications    Reason for Admission:  Chest pain    Hospital Course: Georgina Miller is a 79 y o  female with past medical history of hypertension and familial hyperlipidemia patient who originally presented to the hospital on 7/24/2021 due to chest pain  Patient reported left-sided chest pain that lasts for few seconds described as twinges, without radiation, in the past few months  Denied chest pain with exertion  Upon presentation stable vital signs, EKG unremarkable, troponin negative x4, chest x-ray within normal limits  Physical exam revealed left parasternal pain with palpation  Patient is stable for discharge  She will follow up with her PCP in 1 week and Cardiology as outpatient  Please see above list of diagnoses and related plan for additional information  Condition at Discharge: good    Discharge Day Visit / Exam:   Subjective:  No complaints  Vitals: Blood Pressure: 125/61 (07/25/21 1045)  Pulse: 60 (07/25/21 1045)  Temperature: 98 1 °F (36 7 °C) (07/24/21 2217)  Temp Source: Oral (07/24/21 2217)  Respirations: 18 (07/25/21 1045)  Height: 5' 1" (154 9 cm) (07/24/21 2217)  Weight - Scale: 63 kg (139 lb) (07/24/21 2217)  SpO2: 98 % (07/25/21 1045)  Exam:   Physical Exam  Vitals reviewed  Constitutional:       General: She is not in acute distress  Appearance: She is normal weight  She is not ill-appearing, toxic-appearing or diaphoretic  HENT:      Head: Normocephalic  Mouth/Throat:      Mouth: Mucous membranes are moist    Eyes:      General: No scleral icterus  Right eye: No discharge  Left eye: No discharge  Cardiovascular:      Rate and Rhythm: Normal rate and regular rhythm  Heart sounds: Normal heart sounds  No murmur heard  Pulmonary:      Effort: No respiratory distress  Breath sounds: Normal breath sounds  No wheezing, rhonchi or rales  Abdominal:      General: There is no distension  Palpations: Abdomen is soft  Tenderness: There is no abdominal tenderness   There is no guarding or rebound  Musculoskeletal:      Cervical back: Normal range of motion  Right lower leg: No edema  Left lower leg: No edema  Skin:     General: Skin is warm  Neurological:      Mental Status: She is alert and oriented to person, place, and time  Psychiatric:         Mood and Affect: Mood normal          Behavior: Behavior normal          Thought Content: Thought content normal          Judgment: Judgment normal           Discussion with Family: Patient declined call to   Discharge instructions/Information to patient and family:   See after visit summary for information provided to patient and family  Provisions for Follow-Up Care:  See after visit summary for information related to follow-up care and any pertinent home health orders  Disposition:   Home    Planned Readmission: no    Discharge Medications:  See after visit summary for reconciled discharge medications provided to patient and/or family        **Please Note: This note may have been constructed using a voice recognition system**

## 2021-07-25 NOTE — ED PROVIDER NOTES
History  Chief Complaint   Patient presents with    Nausea     PT STATES SHE HAS SOME NAUSEA AND CHEST DISCOMFORT STARTING THIS EVENING      66-year-old female presents today reporting nausea and chest discomfort which started this evening  Patient states she just does not feel well  Reports mild shortness of breath  Denies exacerbating or alleviating factors  Denies prior history of same  Did not take anything for her symptoms prior to arrival       History provided by:  Patient and medical records  Nausea  The primary symptoms include nausea  Primary symptoms do not include fever, fatigue, abdominal pain, dysuria or rash  The illness does not include chills or back pain  Associated medical issues do not include inflammatory bowel disease, GERD or irritable bowel syndrome  Chest Pain  Pain location:  Substernal area  Pain quality: pressure    Pain radiates to:  Does not radiate  Pain severity:  Mild  Onset quality:  Sudden  Timing:  Constant  Progression:  Unchanged  Chronicity:  New  Context: at rest    Relieved by:  None tried  Worsened by:  Nothing tried  Ineffective treatments:  None tried  Associated symptoms: nausea    Associated symptoms: no abdominal pain, no back pain, no dizziness, no dysphagia, no fatigue, no fever, no headache and no shortness of breath    Risk factors: high cholesterol    Risk factors: no diabetes mellitus and no smoking        Prior to Admission Medications   Prescriptions Last Dose Informant Patient Reported? Taking?    BABY ASPIRIN PO 7/24/2021 at Unknown time  Yes Yes   Sig: Take by mouth every 24 hours PT STATES TAKES BABY ASA EVERY OTHER DAY   Multiple Vitamin (MULTIVITAMIN) tablet 7/24/2021 at Unknown time Self Yes Yes   Sig: Take 1 tablet by mouth daily   Omega-3 Fatty Acids (FISH OIL OMEGA-3) 1000 MG CAPS 7/24/2021 at Unknown time Self Yes Yes   Sig: Take 1 g by mouth daily   Red Yeast Rice 600 MG CAPS 7/24/2021 at Unknown time Self Yes Yes   Sig: Take 600 mg by mouth   metoprolol succinate (TOPROL-XL) 25 mg 24 hr tablet 2021 at Unknown time Self Yes Yes   Sig: Take 25 mg by mouth   metroNIDAZOLE (NORITRATE) 1 % cream  Self Yes No   Sig: Apply topically      Facility-Administered Medications: None       Past Medical History:   Diagnosis Date    Mitral valve prolapse     "cardiologist reports not any more"       Past Surgical History:   Procedure Laterality Date     SECTION      x2    COLONOSCOPY      ROTATOR CUFF REPAIR Right        Family History   Problem Relation Age of Onset    Varicose Veins Maternal Grandmother     Hypertension Mother     Hyperlipidemia Mother     Melanoma Mother     No Known Problems Daughter     No Known Problems Son      I have reviewed and agree with the history as documented  E-Cigarette/Vaping    E-Cigarette Use Never User      E-Cigarette/Vaping Substances    Nicotine No     THC No     CBD No     Flavoring No      Social History     Tobacco Use    Smoking status: Never Smoker    Smokeless tobacco: Never Used   Vaping Use    Vaping Use: Never used   Substance Use Topics    Alcohol use: No    Drug use: No       Review of Systems   Constitutional: Negative for chills, fatigue and fever  HENT: Negative for congestion, postnasal drip, sore throat and trouble swallowing  Eyes: Negative for visual disturbance  Respiratory: Positive for chest tightness  Negative for shortness of breath  Cardiovascular: Positive for chest pain  Negative for leg swelling  Gastrointestinal: Positive for nausea  Negative for abdominal pain  Genitourinary: Negative for dysuria  Musculoskeletal: Negative for back pain  Skin: Negative for rash  Allergic/Immunologic: Negative for immunocompromised state  Neurological: Negative for dizziness, light-headedness and headaches  Psychiatric/Behavioral: Negative for confusion  Physical Exam  Physical Exam  Vitals and nursing note reviewed     Constitutional: Appearance: Normal appearance  She is well-developed  HENT:      Head: Normocephalic and atraumatic  Mouth/Throat:      Mouth: Mucous membranes are moist       Pharynx: Uvula midline  Tonsils: No tonsillar exudate  Eyes:      Pupils: Pupils are equal, round, and reactive to light  Cardiovascular:      Rate and Rhythm: Normal rate and regular rhythm  Pulmonary:      Effort: Pulmonary effort is normal       Breath sounds: Normal breath sounds  Abdominal:      General: Bowel sounds are normal       Palpations: Abdomen is soft  Tenderness: There is no abdominal tenderness  There is no guarding or rebound  Musculoskeletal:         General: No tenderness or deformity  Cervical back: Normal range of motion and neck supple  Skin:     General: Skin is warm and dry  Capillary Refill: Capillary refill takes less than 2 seconds  Neurological:      General: No focal deficit present  Mental Status: She is alert and oriented to person, place, and time  Comments: Patient moving all extremities equally, no focal neuro deficits noted         Psychiatric:         Mood and Affect: Mood normal          Behavior: Behavior normal          Vital Signs  ED Triage Vitals [07/24/21 2217]   Temperature Pulse Respirations Blood Pressure SpO2   98 1 °F (36 7 °C) 60 16 (!) 187/84 97 %      Temp Source Heart Rate Source Patient Position - Orthostatic VS BP Location FiO2 (%)   Oral Monitor Sitting Left arm --      Pain Score       --           Vitals:    07/24/21 2217 07/24/21 2239   BP: (!) 187/84 156/75   Pulse: 60 59   Patient Position - Orthostatic VS: Sitting          Visual Acuity      ED Medications  Medications   ondansetron (ZOFRAN) injection 4 mg (4 mg Intravenous Not Given 7/24/21 2326)   sodium chloride 0 9 % bolus 1,000 mL (1,000 mL Intravenous New Bag 7/24/21 2326)       Diagnostic Studies  Results Reviewed     Procedure Component Value Units Date/Time    Troponin I [[id number]] (Normal) Collected: 07/24/21 2240    Lab Status: Final result Specimen: Blood from Arm, Right Updated: 07/24/21 2302     Troponin I <0 02 ng/mL     Comprehensive metabolic panel [[id number]]  (Abnormal) Collected: 07/24/21 2240    Lab Status: Final result Specimen: Blood from Arm, Right Updated: 07/24/21 2300     Sodium 140 mmol/L      Potassium 3 9 mmol/L      Chloride 103 mmol/L      CO2 30 mmol/L      ANION GAP 7 mmol/L      BUN 28 mg/dL      Creatinine 1 07 mg/dL      Glucose 96 mg/dL      Calcium 9 0 mg/dL      AST 24 U/L      ALT 47 U/L      Alkaline Phosphatase 106 U/L      Total Protein 7 5 g/dL      Albumin 4 0 g/dL      Total Bilirubin 0 25 mg/dL      eGFR 53 ml/min/1 73sq m     Narrative:      [place] guidelines for Chronic Kidney Disease (CKD):     Stage 1 with normal or high GFR (GFR > 90 mL/min/1 73 square meters)    Stage 2 Mild CKD (GFR = 60-89 mL/min/1 73 square meters)    Stage 3A Moderate CKD (GFR = 45-59 mL/min/1 73 square meters)    Stage 3B Moderate CKD (GFR = 30-44 mL/min/1 73 square meters)    Stage 4 Severe CKD (GFR = 15-29 mL/min/1 73 square meters)    Stage 5 End Stage CKD (GFR <15 mL/min/1 73 square meters)  Note: GFR calculation is accurate only with a steady state creatinine    CBC and differential [[id number]]  (Abnormal) Collected: 07/24/21 2240    Lab Status: Final result Specimen: Blood from Arm, Right Updated: 07/24/21 2251     WBC 7 21 Thousand/uL      RBC 4 29 Million/uL      Hemoglobin 13 6 g/dL      Hematocrit 39 8 %      MCV 93 fL      MCH 31 7 pg      MCHC 34 2 g/dL      RDW 12 6 %      MPV 9 6 fL      Platelets 010 Thousands/uL      nRBC 0 /100 WBCs      Neutrophils Relative 34 %      Immat GRANS % 0 %      Lymphocytes Relative 51 %      Monocytes Relative 11 %      Eosinophils Relative 3 %      Basophils Relative 1 %      Neutrophils Absolute 2 44 Thousands/µL      Immature Grans Absolute 0 02 Thousand/uL      Lymphocytes Absolute 3 64 Thousands/µL      Monocytes Absolute 0 82 Thousand/µL      Eosinophils Absolute 0 21 Thousand/µL      Basophils Absolute 0 08 Thousands/µL                  XR chest 2 views    (Results Pending)              Procedures  ECG 12 Lead Documentation Only    Date/Time: 7/24/2021 11:02 PM  Performed by: Carlene Meneses DO  Authorized by: Eb Hillman DO     Indications / Diagnosis:  Nausea, chest discomfort  ECG reviewed by me, the ED Provider: yes    Patient location:  ED  Previous ECG:     Previous ECG:  Unavailable  Comments:      Sinus rhythm at 66 beats per minute  Normal axis, normal intervals, no ST T wave abnormalities suggestive of ischemia  QTC is normal   No old available for comparison  ED Course             HEART Risk Score      Most Recent Value   Heart Score Risk Calculator   History  1 Filed at: 07/24/2021 2305   ECG  1 Filed at: 07/24/2021 2305   Age  2 Filed at: 07/24/2021 2305   Risk Factors  1 Filed at: 07/24/2021 2305   Troponin  0 Filed at: 07/24/2021 2305   HEART Score  5 Filed at: 07/24/2021 2305                      SBIRT 20yo+      Most Recent Value   SBIRT (25 yo +)   In order to provide better care to our patients, we are screening all of our patients for alcohol and drug use  Would it be okay to ask you these screening questions? Yes Filed at: 07/24/2021 2228   Initial Alcohol Screen: US AUDIT-C    1  How often do you have a drink containing alcohol?  0 Filed at: 07/24/2021 2228   2  How many drinks containing alcohol do you have on a typical day you are drinking? 0 Filed at: 07/24/2021 2228   3a  Male UNDER 65: How often do you have five or more drinks on one occasion? 0 Filed at: 07/24/2021 2228   3b  FEMALE Any Age, or MALE 65+: How often do you have 4 or more drinks on one occassion? 0 Filed at: 07/24/2021 2228   Audit-C Score  0 Filed at: 07/24/2021 2228   DALE: How many times in the past year have you       Used an illegal drug or used a prescription medication for non-medical reasons? Never Filed at: 07/24/2021 2228                    [facility]  Number of Diagnoses or Management Options  Chest pain: new and requires workup  Nausea: new and requires workup  Diagnosis management comments: MDM: Patient presents to the Emergency Department and was diagnosed with acute chest pain with concern for ACS  This is a new problem that will require additional planned work-up in a hospitalized setting  Clinical laboratory testing, radiology imaging, and medical testing (EKG) were ordered  I independently reviewed the radiologic imaging, EKG, and laboratory studies  This case is considered high risk secondary to the above listed disease process that poses a threat to bodily function that requires further diagnostic testing and management which may include the administration of parenteral controlled substances  Discussed with SAMINA  We had a detailed discussion of the patient's condition and case,  including need for admission  Accepts to his/her service  Bed request/bridging orders placed             Amount and/or Complexity of Data Reviewed  Clinical lab tests: ordered and reviewed  Tests in the radiology section of CPT®: ordered and reviewed  Tests in the medicine section of CPT®: ordered and reviewed  Review and summarize past medical records: yes  Discuss the patient with other providers: yes  Independent visualization of images, tracings, or specimens: yes    Risk of Complications, Morbidity, and/or Mortality  Presenting problems: high  Diagnostic procedures: high  Management options: high    Patient Progress  Patient progress: stable      Disposition  Final diagnoses:   Nausea   Chest pain     Time reflects when diagnosis was documented in both MDM as applicable and the Disposition within this note     Time User Action Codes Description Comment    7/24/2021 11:06 PM Shawn Hillman [R11 0] Nausea     7/24/2021 11:06 PM Racquel Hillman [R07 9] Chest pain       ED Disposition     ED Disposition Condition Date/Time Comment    Admit Stable Sat Jul 24, 2021 11:45 PM Case was discussed with SAMINA and the patient's admission status was agreed to be Admission Status: observation status to the service of Dr Kelly Merritt   Follow-up Information    None         Patient's Medications   Discharge Prescriptions    No medications on file     No discharge procedures on file      PDMP Review     None          ED Provider  Electronically Signed by           Rubens Gallardo DO  07/24/21 9410

## 2021-07-25 NOTE — H&P
[facility]  H&P- [address] Burton 1950, 79 y o  female MRN: [id number]  Unit/Bed#: ED 27 Encounter: [id number]  Primary Care Provider: Frank Obrien MD   Date and time admitted to hospital: 7/24/2021 10:25 PM    Chest pain  Assessment & Plan  Patient with a history of familial hyperlipidemia presents to the ED with intermittent left-sided chest discomfort associated with shortness of Breath and nausea  ED:  · Hypertensive:  187/84  · Troponin x1 negative  · CBC and CMP unremarkable  · On personal read, do not see any acute cardiopulmonary issues on chest x-ray  · EKG:  Normal sinus rhythm, pending official read  · SAMSON score:  2    Plan:  · Admitted for observation  · Trend troponin  · Monitor on telemetry  · Continue aspirin and metoprolol  · P r n  Hydralazine for SBP greater than 180    Familial hypercholesterolemia  Assessment & Plan  · Follows with cardiology  · Patient takes fish oil and red yeast rice  · Patient has not tolerated statins  in the past    HTN (hypertension)  Assessment & Plan  · Presented to the ED with blood pressure in the 180s / 80s  · Patient reports that blood pressures are usually in the 120s over 80s    Plan:  · Continue home medication, metoprolol  · P r n  Hydralazine for SBP greater than 180    VTE Prophylaxis: Enoxaparin (Lovenox)  / sequential compression device   Code Status: Level 1  POLST: POLST form is not discussed and not completed at this time  Anticipated Length of Stay:  Patient will be admitted on an Observation basis with an anticipated length of stay of  < 2 midnights  Justification for Hospital Stay: Chest pain    Chief Complaint:   Chest pain    History of Present Illness:    [address] Rosmery Burton is a 79 y o  female with a history of familial hyperlipidemia who presents with chest discomfort and nausea   Patient states that this evening she was walking outside when she developed left-sided chest discomfort that she describes as twinges  The discomfort was associated with nausea and shortness of breath  She states that the symptoms are currently improved  She still reports a prior history of mitral valve prolapse  Family history is positive for HTN and HLD and both grandmothers  of MI in their 76s  Patient states that she usually walks 5 miles a day  She has never had symptoms like this in the past unless she was extremely stressed and she reports no significant stress currently  Denies any recent fever, chills, unusual foods, abdominal pain, diarrhea, dizziness  She would be admitted under observation with serial troponin, EKGs, and monitoring on telemetry  Review of Systems:    Review of Systems   Constitutional: Negative for chills and fever  Respiratory: Positive for shortness of breath  Negative for cough  Cardiovascular: Positive for chest pain  Negative for leg swelling  Gastrointestinal: Positive for nausea  Negative for diarrhea and vomiting  Genitourinary: Negative for difficulty urinating  Musculoskeletal: Negative for arthralgias  Skin: Negative for rash  Neurological: Negative for dizziness  Psychiatric/Behavioral: Negative for agitation  Past Medical and Surgical History:     Past Medical History:   Diagnosis Date    Mitral valve prolapse     "cardiologist reports not any more"       Past Surgical History:   Procedure Laterality Date     SECTION      x2    COLONOSCOPY      ROTATOR CUFF REPAIR Right        Meds/Allergies:    Prior to Admission medications    Medication Sig Start Date End Date Taking?  Authorizing Provider   BABY ASPIRIN PO Take by mouth every 24 hours PT STATES TAKES BABY ASA EVERY OTHER DAY   Yes Historical Provider, MD   metoprolol succinate (TOPROL-XL) 25 mg 24 hr tablet Take 25 mg by mouth 18  Yes Historical Provider, MD   Multiple Vitamin (MULTIVITAMIN) tablet Take 1 tablet by mouth daily   Yes Historical Provider, MD   Omega-3 Fatty Acids (FISH OIL OMEGA-3) 1000 MG CAPS Take 1 g by mouth daily 1/29/18  Yes Historical Provider, MD   Red Yeast Rice 600 MG CAPS Take 600 mg by mouth 1/29/18  Yes Historical Provider, MD   metroNIDAZOLE (NORITRATE) 1 % cream Apply topically 11/21/17 11/14/19  Historical Provider, MD     I have reviewed home medications with patient personally  Allergies: Allergies   Allergen Reactions    Statins      DIZZY    Sulfa Antibiotics     Ezetimibe GI Intolerance       Social History:     Marital Status: /Civil Union   Occupation:   Patient Pre-hospital Living Situation: Independent  Patient Pre-hospital Level of Mobility: Independent  Patient Pre-hospital Diet Restrictions: None  Substance Use History:   Social History     Substance and Sexual Activity   Alcohol Use No     Social History     Tobacco Use   Smoking Status Never Smoker   Smokeless Tobacco Never Used     Social History     Substance and Sexual Activity   Drug Use No       Family History:    non-contributory    Physical Exam:     Vitals:   Blood Pressure: 137/63 (07/25/21 0200)  Pulse: 58 (07/25/21 0200)  Temperature: 98 1 °F (36 7 °C) (07/24/21 2217)  Temp Source: Oral (07/24/21 2217)  Respirations: 16 (07/25/21 0200)  Height: 5' 1" (154 9 cm) (07/24/21 2217)  Weight - Scale: 63 kg (139 lb) (07/24/21 2217)  SpO2: 98 % (07/25/21 0200)    Physical Exam  Constitutional:       Appearance: Normal appearance  HENT:      Head: Normocephalic and atraumatic  Mouth/Throat:      Mouth: Mucous membranes are moist    Cardiovascular:      Rate and Rhythm: Normal rate and regular rhythm  Pulmonary:      Effort: Pulmonary effort is normal       Breath sounds: Normal breath sounds  Abdominal:      General: Bowel sounds are normal       Palpations: Abdomen is soft  Tenderness: There is no guarding or rebound  Musculoskeletal:      Cervical back: Neck supple  Right lower leg: No edema  Left lower leg: No edema  Skin:     General: Skin is warm and dry  Neurological:      General: No focal deficit present  Mental Status: She is alert and oriented to person, place, and time  Psychiatric:         Mood and Affect: Mood normal          Behavior: Behavior normal            Additional Data:     Lab Results: I have personally reviewed pertinent reports  Results from last 7 days   Lab Units 07/24/21  2240   WBC Thousand/uL 7 21   HEMOGLOBIN g/dL 13 6   HEMATOCRIT % 39 8   PLATELETS Thousands/uL 303   NEUTROS PCT % 34*   LYMPHS PCT % 51*   MONOS PCT % 11   EOS PCT % 3     Results from last 7 days   Lab Units 07/24/21  2240   POTASSIUM mmol/L 3 9   CHLORIDE mmol/L 103   CO2 mmol/L 30   BUN mg/dL 28*   CREATININE mg/dL 1 07   CALCIUM mg/dL 9 0   ALK PHOS U/L 106   ALT U/L 47   AST U/L 24         Imaging: I have personally reviewed pertinent reports  No results found  EKG, Pathology, and Other Studies Reviewed on Admission:   · EKG: Normal sinus rhythm @ 66 bpm    Epic / [facility] Everywhere Records Reviewed: Yes     ** Please Note: This note has been constructed using a voice recognition system   **

## 2021-07-25 NOTE — ED NOTES
Dr Aquilino Moise at bedside  Pt to be discharged later today  Pt aware        Slade Dorantes, RN  07/25/21 3982

## 2021-07-25 NOTE — ASSESSMENT & PLAN NOTE
Patient with a history of familial hyperlipidemia presents to the ED with intermittent left-sided chest discomfort  Pain without radiation, duration of a few seconds, reproducible  Physical exam illicit pain with digital pressure left parasternal  Troponin negative x4, CBC, CMP unremarkable  Chest x-ray unremarkable  EKG showed sinus bradycardia, low voltage, right bundle branch block incomplete  Telemetry showed rare PVCs  Most likely musculoskeletal  Stable for discharge    Follow up with Cardiology as outpatient

## 2021-12-07 ENCOUNTER — ANNUAL EXAM (OUTPATIENT)
Dept: OBGYN CLINIC | Facility: CLINIC | Age: 71
End: 2021-12-07
Payer: COMMERCIAL

## 2021-12-07 VITALS
DIASTOLIC BLOOD PRESSURE: 78 MMHG | BODY MASS INDEX: 26.66 KG/M2 | SYSTOLIC BLOOD PRESSURE: 119 MMHG | WEIGHT: 141.2 LBS | HEIGHT: 61 IN

## 2021-12-07 DIAGNOSIS — Z13.820 SCREENING FOR OSTEOPOROSIS: ICD-10-CM

## 2021-12-07 DIAGNOSIS — Z78.0 POST-MENOPAUSAL: ICD-10-CM

## 2021-12-07 DIAGNOSIS — Z12.31 ENCOUNTER FOR SCREENING MAMMOGRAM FOR MALIGNANT NEOPLASM OF BREAST: ICD-10-CM

## 2021-12-07 DIAGNOSIS — Z01.419 ENCOUNTER FOR ANNUAL ROUTINE GYNECOLOGICAL EXAMINATION: Primary | ICD-10-CM

## 2021-12-07 DIAGNOSIS — N89.8 VAGINAL DRYNESS: ICD-10-CM

## 2021-12-07 PROCEDURE — 99397 PER PM REEVAL EST PAT 65+ YR: CPT | Performed by: STUDENT IN AN ORGANIZED HEALTH CARE EDUCATION/TRAINING PROGRAM

## 2021-12-07 RX ORDER — ESTRADIOL 0.1 MG/G
CREAM VAGINAL
COMMUNITY
Start: 2021-07-22 | End: 2021-12-07

## 2022-03-02 ENCOUNTER — TELEPHONE (OUTPATIENT)
Dept: OBGYN CLINIC | Facility: CLINIC | Age: 72
End: 2022-03-02

## 2022-12-12 ENCOUNTER — ANNUAL EXAM (OUTPATIENT)
Dept: OBGYN CLINIC | Facility: CLINIC | Age: 72
End: 2022-12-12

## 2022-12-12 VITALS
DIASTOLIC BLOOD PRESSURE: 70 MMHG | HEIGHT: 61 IN | SYSTOLIC BLOOD PRESSURE: 102 MMHG | WEIGHT: 131 LBS | BODY MASS INDEX: 24.73 KG/M2

## 2022-12-12 DIAGNOSIS — Z12.31 ENCOUNTER FOR SCREENING MAMMOGRAM FOR MALIGNANT NEOPLASM OF BREAST: ICD-10-CM

## 2022-12-12 DIAGNOSIS — Z01.419 ROUTINE GYNECOLOGICAL EXAMINATION: Primary | ICD-10-CM

## 2022-12-12 DIAGNOSIS — Z78.0 POSTMENOPAUSAL STATUS: ICD-10-CM

## 2022-12-12 DIAGNOSIS — Z13.820 SCREENING FOR OSTEOPOROSIS: ICD-10-CM

## 2022-12-12 RX ORDER — FLUOROMETHOLONE ACETATE 1 MG/ML
SUSPENSION/ DROPS OPHTHALMIC
COMMUNITY
Start: 2022-12-01

## 2022-12-12 NOTE — PROGRESS NOTES
Assessment   67 y o  postmenopausal female presenting for annual exam      Plan   Diagnoses and all orders for this visit:    Routine gynecological examination  Normal findings on routine exam    considerations reviewed  Aware of sx to report  Breast awareness/SBE encouraged  Encouraged 150 min of exercise per week  Reviewed balanced diet  Vitamin D and Calcium supplement recommended  Screening for osteoporosis  -     DXA bone density spine hip and pelvis; Future    Vitamin D and Calcium supplement encouraged  To increase intake of calcium in diet as well  Reviewed importance of weight bearing exercise  Will plan to monitor DEXA per recommendations     Postmenopausal status  -     DXA bone density spine hip and pelvis; Future    Encounter for screening mammogram for malignant neoplasm of breast  -     Mammo screening bilateral w 3d & cad; Future        Pap - no longer indicated   Mammo slip given   Colonoscopy due 2025   DEXA due - referral given      RTO one year for yearly exam or sooner as needed  __________________________________________________________________      Subjective     Laron Ramos is a 67 y o  postmenopausal female presenting for annual exam  She is without complaint and does not want STD testing today  She is a retired   Now enjoys spending time with family and walking  Helps to care for her grandkids at night because her daughter works over nights  Grandkids are 5, 7, and 12    SCREENING  Last Pap: 12/1/2020 NILM  Last Mammo: 09/16/2022 BIRADS 1 - Negative  Last Colonoscopy: 01/01/2015 10 year recall  Last DEXA: 1/24/2020 - osteopenia    GYN  Denies postmenopausal vaginal bleeding, dryness, vaginal discharge, itching, odor, pelvic pain and vulvar/vaginal symptoms    Menopausal symptoms rare hot flash    Sexually active: No  Sexual dysfunction: Pain and vaginal dryness  Unresponsive to therapies previously tried  Not interested in hormonal therapy  Declines tx at this time noting she and  are comfortable with this  Hx STI: denies     Hx Abnormal pap: denies  We reviewed ASCCP guidelines for Pap testing today  OB  [id number]        Complaints: denies  Denies leakage/difficulty urinating, dysuria, hematuria, and urinary frequency/urgency      BREAST  Complaints: denies  Denies: breast lump, breast tenderness, dryness, nipple discharge, pruritus, skin color change, and skin lesion(s)  Personal hx: none reported     GENERAL  PMH reviewed/updated and is as below  Patient does follow with a PCP    Exercise: walking regularly      Pertinent Family Hx:   Family hx of breast cancer: no  Family hx of ovarian cancer: no  Family hx of colon cancer: no      Past Medical History:   Diagnosis Date   • Mitral valve prolapse     "cardiologist reports not any more"       Past Surgical History:   Procedure Laterality Date   •  SECTION      x2   • COLONOSCOPY     • ROTATOR CUFF REPAIR Right          Current Outpatient Medications:   •  BABY ASPIRIN PO, Take by mouth every 24 hours PT STATES TAKES BABY ASA EVERY OTHER DAY, Disp: , Rfl:   •  Flarex 0 1 % ophthalmic suspension, , Disp: , Rfl:   •  metoprolol succinate (TOPROL-XL) 25 mg 24 hr tablet, Take 25 mg by mouth, Disp: , Rfl:   •  Multiple Vitamin (MULTIVITAMIN) tablet, Take 1 tablet by mouth daily, Disp: , Rfl:   •  Omega-3 Fatty Acids (FISH OIL OMEGA-3) 1000 MG CAPS, Take 1 g by mouth daily, Disp: , Rfl:   •  Red Yeast Rice 600 MG CAPS, Take 600 mg by mouth, Disp: , Rfl:     Allergies   Allergen Reactions   • Statins      DIZZY   • Sulfa Antibiotics    • Ezetimibe GI Intolerance       Social History     Socioeconomic History   • Marital status: /Civil Union     Spouse name: Not on file   • Number of children: Not on file   • Years of education: Not on file   • Highest education level: Not on file   Occupational History   • Occupation: retired   Tobacco Use   • Smoking status: Never   • Smokeless tobacco: Never   Vaping Use   • Vaping Use: Never used   Substance and Sexual Activity   • Alcohol use: No   • Drug use: No   • Sexual activity: Not Currently     Birth control/protection: Abstinence, Post-menopausal   Other Topics Concern   • Not on file   Social History Narrative   • Not on file     Social Determinants of Health     Financial Resource Strain: Not on file   Food Insecurity: Not on file   Transportation Needs: Not on file   Physical Activity: Not on file   Stress: Not on file   Social Connections: Not on file   Intimate Partner Violence: Not on file   Housing Stability: Not on file         Review of Systems     ROS:  Constitutional: Negative for appetite change, fatigue and unexpected weight change  Respiratory: Negative  Cardiovascular: Negative  Gastrointestinal: Negative for abdominal pain and change in bowel habits/constipation/diarrhea  Breasts:  Negative other than as noted above  Genitourinary: Negative other than as noted above  Psychiatric: Negative for mood difficulties  Objective      /70 (BP Location: Left arm, Patient Position: Sitting, Cuff Size: Standard)   Ht 5' 0 75" (1 543 m)   Wt 59 4 kg (131 lb)   LMP  (LMP Unknown)   BMI 24 96 kg/m²     Physical Examination:  Patient appears well and is not in distress  Neck is supple without masses  Breasts are symmetrical without mass, tenderness, nipple discharge, skin changes or adenopathy  Abdomen is soft and nontender without masses  External genitals are normal without lesions or rashes  Urethral meatus and urethra are normal  Bladder is normal to palpation  Vagina is normal without discharge or bleeding  Atrophic vulvovaginal changes  Cervix is normal without discharge or lesion  Uterus is normal, mobile, nontender without palpable mass  Adnexa are normal, nontender, without palpable mass

## 2023-12-12 PROBLEM — M85.80 OSTEOPENIA: Status: ACTIVE | Noted: 2023-12-12

## 2023-12-12 NOTE — PROGRESS NOTES
Assessment   68 y.o. postmenopausal female presenting for annual exam.     Plan   Diagnoses and all orders for this visit:    Routine gynecological examination  Normal findings on routine exam.  Encouraged 150 min of exercise per week. Reviewed balanced diet. Multivitamin encouraged   Breast awareness/SBE encouraged     Osteopenia, unspecified location  Vitamin D and Calcium supplement encouraged. To increase intake of calcium in diet as well. Reviewed importance of weight bearing exercise. Will plan to monitor DEXA per recommendations     Encounter for screening mammogram for malignant neoplasm of breast  -     Mammo screening bilateral w 3d & cad; Future        Pap - no longer indicated   Mammo slip given   Colonoscopy due 2025   DEXA due 2025      RTO one year for yearly exam or sooner as needed. __________________________________________________________________      Subjective     Reina Moreno is a 68 y.o. postmenopausal female presenting for annual exam.     Family is doing well - 3 grandkids -- two boys are wrestlers year round. Granddaughter enjoys dance. Walks daily for exercise -- very diligent about closing her exercise rings daily! Enjoys [employer] [place]. SCREENING  Last Pap: 12/1/2020 NILM   Last Mammo: 09/18/2023 BIRADS 1 - Negative  Last Colonoscopy: 01/01/2015 10 year recall  Last DEXA: 1/5/23 osteopenia     GYN  Denies postmenopausal vaginal bleeding, dryness, vaginal discharge, itching, odor, pelvic pain and vulvar/vaginal symptoms    Sexually active: No -- Pain and vaginal dryness. Unresponsive to therapies previously tried. Not interested in hormonal therapy. Declines tx at this time noting she and  are comfortable with this. Hx Abnormal pap: denies  We reviewed ASCCP guidelines for Pap testing today.        OB  [id number]      Complaints: TAMIKO with a recent cold   Denies difficulty urinating, dysuria, hematuria, and urinary frequency/urgency    BREAST  Complaints: denies  Denies: breast lump, breast tenderness, dryness, nipple discharge, pruritus, skin color change, and skin lesion(s)    Pertinent Family Hx:   Family hx of breast cancer: no  Family hx of ovarian cancer: no  Family hx of colon cancer: no      Past Medical History:   Diagnosis Date    Mitral valve prolapse     "cardiologist reports not any more"       Past Surgical History:   Procedure Laterality Date     SECTION      x2    COLONOSCOPY      ROTATOR CUFF REPAIR Right          Current Outpatient Medications:     BABY ASPIRIN PO, Take by mouth every 24 hours PT STATES TAKES BABY ASA EVERY OTHER DAY, Disp: , Rfl:     Flarex 0.1 % ophthalmic suspension, , Disp: , Rfl:     metoprolol succinate (TOPROL-XL) 25 mg 24 hr tablet, Take 25 mg by mouth, Disp: , Rfl:     Multiple Vitamin (MULTIVITAMIN) tablet, Take 1 tablet by mouth daily, Disp: , Rfl:     Omega-3 Fatty Acids (FISH OIL OMEGA-3) 1000 MG CAPS, Take 1 g by mouth daily, Disp: , Rfl:     Red Yeast Rice 600 MG CAPS, Take 600 mg by mouth, Disp: , Rfl:     Allergies   Allergen Reactions    Statins      DIZZY    Sulfa Antibiotics     Ezetimibe GI Intolerance       Social History     Socioeconomic History    Marital status: /Civil Union     Spouse name: Not on file    Number of children: Not on file    Years of education: Not on file    Highest education level: Not on file   Occupational History    Occupation: retired   Tobacco Use    Smoking status: Never    Smokeless tobacco: Never   Vaping Use    Vaping status: Never Used   Substance and Sexual Activity    Alcohol use: No    Drug use: No    Sexual activity: Not Currently     Birth control/protection: Abstinence, Post-menopausal   Other Topics Concern    Not on file   Social History Narrative    Not on file     Social Determinants of Health     Financial Resource Strain: Not on file   Food Insecurity: Not on file   Transportation Needs: Not on file   Physical Activity: Not on file   Stress: Not on file   Social Connections: Not on file   Intimate Partner Violence: Not on file   Housing Stability: Not on file         Review of Systems   Constitutional: Negative. Respiratory: Negative. Cardiovascular: Negative   Gastrointestinal: Negative   Breasts: As noted above. Genitourinary: As noted above. Psychiatric: Negative       Objective      /78 (BP Location: Left arm, Patient Position: Sitting, Cuff Size: Standard)   Pulse 65   Ht 5' (1.524 m)   Wt 60.6 kg (133 lb 9.6 oz)   LMP  (LMP Unknown)   BMI 26.09 kg/m²     Physical Examination:  Patient appears well and is not in distress  Breasts are symmetrical without mass, tenderness, nipple discharge, skin changes or adenopathy. Abdomen is soft and nontender without masses. External genitals are normal without lesions or rashes. Fusion of inferior labia minora b/l. Urethral meatus and urethra are normal  Bladder is normal to palpation  Vagina is without discharge or bleeding. Atrophic tissue changes  Cervix is normal without discharge or lesion. Uterus is normal, mobile, nontender without palpable mass. Adnexa are normal, nontender, without palpable mass.

## 2023-12-13 ENCOUNTER — ANNUAL EXAM (OUTPATIENT)
Dept: OBGYN CLINIC | Facility: CLINIC | Age: 73
End: 2023-12-13

## 2023-12-13 VITALS
WEIGHT: 133.6 LBS | HEART RATE: 65 BPM | DIASTOLIC BLOOD PRESSURE: 78 MMHG | BODY MASS INDEX: 26.23 KG/M2 | HEIGHT: 60 IN | SYSTOLIC BLOOD PRESSURE: 116 MMHG

## 2023-12-13 DIAGNOSIS — M85.80 OSTEOPENIA, UNSPECIFIED LOCATION: ICD-10-CM

## 2023-12-13 DIAGNOSIS — Z01.419 ROUTINE GYNECOLOGICAL EXAMINATION: Primary | ICD-10-CM

## 2023-12-13 DIAGNOSIS — Z12.31 ENCOUNTER FOR SCREENING MAMMOGRAM FOR MALIGNANT NEOPLASM OF BREAST: ICD-10-CM

## 2024-01-26 ENCOUNTER — TELEPHONE (OUTPATIENT)
Dept: OBGYN CLINIC | Facility: MEDICAL CENTER | Age: 74
End: 2024-01-26

## 2024-01-26 NOTE — TELEPHONE ENCOUNTER
[employer] called on behalf of pt as yearly was billed incorrectly. It was billed as a 24 month visit instead of a 12 month visit. Please resubmit.

## 2025-01-08 ENCOUNTER — ANNUAL EXAM (OUTPATIENT)
Dept: OBGYN CLINIC | Facility: CLINIC | Age: 75
End: 2025-01-08
Payer: MEDICARE

## 2025-01-08 VITALS
DIASTOLIC BLOOD PRESSURE: 90 MMHG | HEIGHT: 61 IN | WEIGHT: 140.6 LBS | BODY MASS INDEX: 26.55 KG/M2 | SYSTOLIC BLOOD PRESSURE: 128 MMHG

## 2025-01-08 DIAGNOSIS — Z13.820 SCREENING FOR OSTEOPOROSIS: ICD-10-CM

## 2025-01-08 DIAGNOSIS — Z78.0 POSTMENOPAUSAL STATUS: ICD-10-CM

## 2025-01-08 DIAGNOSIS — Z12.31 ENCOUNTER FOR SCREENING MAMMOGRAM FOR MALIGNANT NEOPLASM OF BREAST: ICD-10-CM

## 2025-01-08 DIAGNOSIS — Z01.419 ENCOUNTER FOR ANNUAL ROUTINE GYNECOLOGICAL EXAMINATION: Primary | ICD-10-CM

## 2025-01-08 DIAGNOSIS — M85.80 OSTEOPENIA, UNSPECIFIED LOCATION: ICD-10-CM

## 2025-01-08 DIAGNOSIS — Z12.11 SCREENING FOR COLON CANCER: ICD-10-CM

## 2025-01-08 PROCEDURE — G0101 CA SCREEN;PELVIC/BREAST EXAM: HCPCS | Performed by: PHYSICIAN ASSISTANT

## 2025-01-08 NOTE — PROGRESS NOTES
Assessment   74 y.o. postmenopausal female presenting for annual exam.     Plan   Diagnoses and all orders for this visit:    Encounter for annual routine gynecological examination    Normal findings on routine exam.   considerations reviewed. Aware of sx to report.   Breast awareness/SBE encouraged.  Encouraged 150 min of exercise per week.  Reviewed balanced diet.   Vitamin D and Calcium supplement recommended.     Screening for osteoporosis  -     DXA bone density spine hip and pelvis; Future    Osteopenia, unspecified location  -     DXA bone density spine hip and pelvis; Future    Postmenopausal status  -     DXA bone density spine hip and pelvis; Future    Screening for colon cancer  -     Ambulatory referral to Gastroenterology; Future    Encounter for screening mammogram for malignant neoplasm of breast  -     Mammo screening bilateral w 3d and cad; Future        Pap - no longer indicated   Mammo - ordered    Colonoscopy due 2025   DEXA due - order given       RTO one year for yearly exam or sooner as needed.      __________________________________________________________________      Subjective     Barbara Cohen is a 74 y.o. postmenopausal female presenting for annual exam.     Overall doing well. Her grandkids (two boys, one girl) are doing well. Daughter in law was recently dx with a rare form of anal cancer.. Undergoing tx but wont' know efficacy or prognosis until reevaluated in May.      Barbara is exercising daily. Doesn't go to bed until closing her move ring, which she has done for nearly 800 consecutive days!    SCREENING  Last Pap: 12/1/2020 NILM   Last Mammo: 09/16/2024 BIRADS 1 - Negative  Last Colonoscopy: 01/01/2015 10 year recall  Last DEXA: 1/5/23 osteopenia     GYN  Denies postmenopausal vaginal bleeding, dryness, vaginal discharge, itching, odor, pelvic pain and vulvar/vaginal symptoms    Sexually active: No    ASCCP guidelines reviewed and this low risk patient was advised she  "meets criteria to d/c pap screening given age and recent negatives.         OB        Complaints: denies  Denies leakage/difficulty urinating, dysuria, hematuria, and urinary frequency/urgency    BREAST  Complaints: denies  Denies: breast lump, breast tenderness, dryness, nipple discharge, pruritus, skin color change, and skin lesion(s)    Pertinent Family Hx:   Family hx of breast cancer: no  Family hx of ovarian cancer: no  Family hx of colon cancer: no      Past Medical History:   Diagnosis Date    Mitral valve prolapse     \"cardiologist reports not any more\"       Past Surgical History:   Procedure Laterality Date     SECTION  79&83    x2    COLONOSCOPY      ROTATOR CUFF REPAIR Right          Current Outpatient Medications:     BABY ASPIRIN PO, Take by mouth every 24 hours PT STATES TAKES BABY ASA EVERY OTHER DAY, Disp: , Rfl:     Flarex 0.1 % ophthalmic suspension, , Disp: , Rfl:     metoprolol succinate (TOPROL-XL) 25 mg 24 hr tablet, Take 25 mg by mouth, Disp: , Rfl:     Multiple Vitamin (MULTIVITAMIN) tablet, Take 1 tablet by mouth daily, Disp: , Rfl:     Omega-3 Fatty Acids (FISH OIL OMEGA-3) 1000 MG CAPS, Take 1 g by mouth daily, Disp: , Rfl:     Red Yeast Rice 600 MG CAPS, Take 600 mg by mouth, Disp: , Rfl:     Allergies   Allergen Reactions    Statins      DIZZY    Sulfa Antibiotics     Ezetimibe GI Intolerance       Social History     Socioeconomic History    Marital status: /Civil Union     Spouse name: Not on file    Number of children: Not on file    Years of education: Not on file    Highest education level: Not on file   Occupational History    Occupation: retired   Tobacco Use    Smoking status: Never    Smokeless tobacco: Never   Vaping Use    Vaping status: Never Used   Substance and Sexual Activity    Alcohol use: No    Drug use: No    Sexual activity: Not Currently     Birth control/protection: Abstinence, Post-menopausal   Other Topics Concern    Not on file   Social " "History Narrative    Not on file     Social Drivers of Health     Financial Resource Strain: Not on file   Food Insecurity: Not on file   Transportation Needs: Not on file   Physical Activity: Not on file   Stress: Not on file   Social Connections: Not on file   Intimate Partner Violence: Not on file   Housing Stability: Not on file         Review of Systems   Constitutional: Negative.   Respiratory: Negative.    Cardiovascular: Negative   Gastrointestinal: Negative   Breasts: As noted above.   Genitourinary: As noted above.   Psychiatric: Negative       Objective      /90 (BP Location: Left arm, Patient Position: Sitting, Cuff Size: Standard)   Ht 5' 0.63\" (1.54 m)   Wt 63.8 kg (140 lb 9.6 oz)   LMP  (LMP Unknown)   BMI 26.89 kg/m²     Physical Examination:  Patient appears well and is not in distress  Breasts are symmetrical without mass, tenderness, nipple discharge, skin changes or adenopathy.   Abdomen is soft and nontender without masses.   External genitals are normal without lesions or rashes.  Urethral meatus and urethra are normal  Bladder is normal to palpation  Vagina is without discharge or bleeding. Atrophic tissue changes. Small speculum used.  Cervix is normal without discharge or lesion.   Uterus is normal, mobile, nontender without palpable mass.  Adnexa are normal, nontender, without palpable mass.   "

## 2025-02-24 ENCOUNTER — APPOINTMENT (EMERGENCY)
Dept: RADIOLOGY | Facility: HOSPITAL | Age: 75
End: 2025-02-24
Payer: MEDICARE

## 2025-02-24 ENCOUNTER — HOSPITAL ENCOUNTER (EMERGENCY)
Facility: HOSPITAL | Age: 75
Discharge: HOME | End: 2025-02-24
Attending: EMERGENCY MEDICINE | Admitting: EMERGENCY MEDICINE
Payer: MEDICARE

## 2025-02-24 VITALS
DIASTOLIC BLOOD PRESSURE: 60 MMHG | HEART RATE: 82 BPM | RESPIRATION RATE: 18 BRPM | SYSTOLIC BLOOD PRESSURE: 130 MMHG | HEIGHT: 60 IN | BODY MASS INDEX: 27.83 KG/M2 | OXYGEN SATURATION: 98 % | TEMPERATURE: 98 F | WEIGHT: 141.76 LBS

## 2025-02-24 DIAGNOSIS — T14.8XXA COMMINUTED FRACTURE: Primary | ICD-10-CM

## 2025-02-24 DIAGNOSIS — S62.304A: ICD-10-CM

## 2025-02-24 PROCEDURE — 99283 EMERGENCY DEPT VISIT LOW MDM: CPT

## 2025-02-24 PROCEDURE — 99284 EMERGENCY DEPT VISIT MOD MDM: CPT | Performed by: EMERGENCY MEDICINE

## 2025-02-24 PROCEDURE — 73130 X-RAY EXAM OF HAND: CPT

## 2025-02-24 NOTE — ED PROVIDER NOTES
Time reflects when diagnosis was documented in both MDM as applicable and the Disposition within this note       Time User Action Codes Description Comment    2/24/2025 11:12 AM Geraldo Oliveros Add [T14.8XXA] Comminuted fracture     2/24/2025 11:12 AM Geraldo Oliveros Remove [T14.8XXA] Comminuted fracture     2/24/2025 11:12 AM Geraldo Oliveros Add [S62.399B] Open comminuted fracture of metacarpal bone     2/24/2025 11:12 AM Geraldo Oliveros Remove [S62.399B] Open comminuted fracture of metacarpal bone     2/24/2025 11:13 AM Geraldo Oliveros Add [T14.8XXA] Comminuted fracture     2/24/2025 11:14 AM Geraldo Oliveros Add [S62.304A] Closed nondisplaced fracture of fourth metacarpal bone of right hand           ED Disposition       ED Disposition   Discharge    Condition   Stable    Date/Time   Mon Feb 24, 2025 11:14 AM    Comment   Barbara Cohen discharge to home/self care.                   Assessment & Plan       Medical Decision Making  The Pt was found to have a closed nondisplaced comminuted fracture of the fourth metacarpal bone of the right hand on XR. The Pt is otherwise well appearing, hemodynamically stable, and shows no evidence of neurovascular injury or compartment syndrome. Patient was placed in volar splint, patient tolerated procedure well, pain well-controlled with OTC pain management.  Ambulatory referral for orthopedic hand follow-up given.  Patient in agreement with management and discharge plan.  Patient discharged home.    Amount and/or Complexity of Data Reviewed  Radiology: ordered. Decision-making details documented in ED Course.        ED Course as of 02/26/25 1054   Mon Feb 24, 2025   1111 XR hand 3+ views RIGHT  Comminuted fracture of the distal head of the ring (fourth) metacarpal. Mild apex-radial angulation without significant displacement or articular malalignment.       Medications - No data to display    ED Risk Strat Scores                            SBIRT 20yo+   "    Flowsheet Row Most Recent Value   Initial Alcohol Screen: US AUDIT-C     1. How often do you have a drink containing alcohol? 0 Filed at: 2025   2. How many drinks containing alcohol do you have on a typical day you are drinking?  0 Filed at: 2025   3a. Male UNDER 65: How often do you have five or more drinks on one occasion? 0 Filed at: 2025   3b. FEMALE Any Age, or MALE 65+: How often do you have 4 or more drinks on one occassion? 0 Filed at: 2025   Audit-C Score 0 Filed at: 2025   DALE: How many times in the past year have you...    Used an illegal drug or used a prescription medication for non-medical reasons? Never Filed at: 2025                            History of Present Illness       Chief Complaint   Patient presents with    Hand Injury     Fell this morning and landed on her R hand. Swollen and bruised now. Denies any other pain/ injury.        Past Medical History:   Diagnosis Date    Mitral valve prolapse     \"cardiologist reports not any more\"      Past Surgical History:   Procedure Laterality Date     SECTION  79&83    x2    COLONOSCOPY      ROTATOR CUFF REPAIR Right       Family History   Problem Relation Age of Onset    Hypertension Mother     Hyperlipidemia Mother     Melanoma Mother     No Known Problems Daughter     No Known Problems Son     Varicose Veins Maternal Grandmother     Ovarian cancer Paternal Aunt 96    Breast cancer Neg Hx     Cervical cancer Neg Hx     Colon cancer Neg Hx     Endometrial cancer Neg Hx     Uterine cancer Neg Hx       Social History     Tobacco Use    Smoking status: Never    Smokeless tobacco: Never   Vaping Use    Vaping status: Never Used   Substance Use Topics    Alcohol use: No    Drug use: No      E-Cigarette/Vaping    E-Cigarette Use Never User       E-Cigarette/Vaping Substances    Nicotine No     THC No     CBD No     Flavoring No       I have reviewed and agree with the history as " documented.     Pt is a 74 y.o. female who presents to the ED on February 24, 2025. Patient presents with left hand injury over the fourth and fifth metacarpals after mechanical fall.  Patient states that earlier this morning she was attempting to reach for her bag as she was leaving her daughter's home when she tripped over said bag falling forward landing on her hand, unclear if in an extended or flexed position of the wrist.  Patient denies head strike, loss of consciousness, not on Eliquis, Xarelto, or warfarin, takes baby aspirin.  Denied prior chest pain, shortness of breath, presyncope, syncope, weakness.  Patient otherwise has no other distracting injuries.  Denies any gross deformities of the joints of the upper extremities or lower extremities.  ROS otherwise negative.  No other concerns at this time.      Hand Injury      Review of Systems        Objective       ED Triage Vitals [02/24/25 0938]   Temperature Pulse Blood Pressure Respirations SpO2 Patient Position - Orthostatic VS   98 °F (36.7 °C) 82 130/60 18 98 % Sitting      Temp Source Heart Rate Source BP Location FiO2 (%) Pain Score    Oral Monitor Left arm -- 2      Vitals      Date and Time Temp Pulse SpO2 Resp BP Pain Score FACES Pain Rating User   02/24/25 0938 98 °F (36.7 °C) 82 98 % 18 130/60 2 -- LETICIA            Physical Exam  Vitals and nursing note reviewed.   Constitutional:       General: She is not in acute distress.     Appearance: She is well-developed.   HENT:      Head: Normocephalic and atraumatic.   Eyes:      Conjunctiva/sclera: Conjunctivae normal.   Cardiovascular:      Rate and Rhythm: Normal rate and regular rhythm.      Heart sounds: No murmur heard.  Pulmonary:      Effort: Pulmonary effort is normal. No respiratory distress.      Breath sounds: Normal breath sounds.   Abdominal:      Palpations: Abdomen is soft.      Tenderness: There is no abdominal tenderness.   Musculoskeletal:         General: No swelling.         Arms:       Cervical back: Neck supple.      Comments: Tenderness and ecchymoses over the fourth and fifth metacarpals of the dorsal right hand.  No gross deformities, no evidence of open fracture.  No other distracting injuries, point tenderness.  Neurovascularly intact.   Skin:     General: Skin is warm and dry.      Capillary Refill: Capillary refill takes less than 2 seconds.   Neurological:      Mental Status: She is alert.   Psychiatric:         Mood and Affect: Mood normal.         Results Reviewed       None            XR hand 3+ views RIGHT   Final Interpretation by Eddy Araujo MD (02/24 1051)      Comminuted fracture of the distal head of the ring (fourth) metacarpal. Mild apex-radial angulation without significant displacement or articular malalignment.         Computerized Assisted Algorithm (CAA) may have been used to analyze all applicable images.         Workstation performed: [id number]             Procedures    ED Medication and Procedure Management   Prior to Admission Medications   Prescriptions Last Dose Informant Patient Reported? Taking?   BABY ASPIRIN PO   Yes No   Sig: Take by mouth every 24 hours PT STATES TAKES BABY ASA EVERY OTHER DAY   Flarex 0.1 % ophthalmic suspension   Yes No   Multiple Vitamin (MULTIVITAMIN) tablet  Self Yes No   Sig: Take 1 tablet by mouth daily   Omega-3 Fatty Acids (FISH OIL OMEGA-3) 1000 MG CAPS  Self Yes No   Sig: Take 1 g by mouth daily   Red Yeast Rice 600 MG CAPS  Self Yes No   Sig: Take 600 mg by mouth   metoprolol succinate (TOPROL-XL) 25 mg 24 hr tablet  Self Yes No   Sig: Take 25 mg by mouth      Facility-Administered Medications: None     Discharge Medication List as of 2/24/2025 11:15 AM        CONTINUE these medications which have NOT CHANGED    Details   BABY ASPIRIN PO Take by mouth every 24 hours PT STATES TAKES BABY ASA EVERY OTHER DAY, Historical Med      Flarex 0.1 % ophthalmic suspension Starting Thu 12/1/2022, Historical Med       metoprolol succinate (TOPROL-XL) 25 mg 24 hr tablet Take 25 mg by mouth, Starting Tue 7/24/2018, Historical Med      Multiple Vitamin (MULTIVITAMIN) tablet Take 1 tablet by mouth daily, Historical Med      Omega-3 Fatty Acids (FISH OIL OMEGA-3) 1000 MG CAPS Take 1 g by mouth daily, Starting Mon 1/29/2018, Historical Med      Red Yeast Rice 600 MG CAPS Take 600 mg by mouth, Starting Mon 1/29/2018, Historical Med             ED SEPSIS DOCUMENTATION   Time reflects when diagnosis was documented in both MDM as applicable and the Disposition within this note       Time User Action Codes Description Comment    2/24/2025 11:12 AM Geraldo Oliveros [T14.8XXA] Comminuted fracture     2/24/2025 11:12 AM Geraldo Oliveros [T14.8XXA] Comminuted fracture     2/24/2025 11:12 AM Geraldo Oliveros [S62.399B] Open comminuted fracture of metacarpal bone     2/24/2025 11:12 AM Geraldo Oliveros [S62.399B] Open comminuted fracture of metacarpal bone     2/24/2025 11:13 AM Geraldo Oliveros [T14.8XXA] Comminuted fracture     2/24/2025 11:14 AM Geraldo Oliveros [S62.304A] Closed nondisplaced fracture of fourth metacarpal bone of right hand                  Geraldo Oliveros MD  02/26/25 1054

## 2025-02-24 NOTE — ED ATTENDING ATTESTATION
2/24/2025  I, Ricci Hare MD, saw and evaluated the patient. I have discussed the patient with the resident/non-physician practitioner and agree with the resident's/non-physician practitioner's findings, Plan of Care, and MDM as documented in the resident's/non-physician practitioner's note, except where noted. All available labs and Radiology studies were reviewed.  I was present for key portions of any procedure(s) performed by the resident/non-physician practitioner and I was immediately available to provide assistance.       At this point I agree with the current assessment done in the Emergency Department.  I have conducted an independent evaluation of this patient a history and physical is as follows:  Briefly, 74-year-old right-handed female with right hand pain and swelling status post fall.  Patient states that last night she tripped over a bag, fell, caught her self in the right hand.  She noted immediate pain of the area, but elevated, applied ice, wrapped in Ace wrap, rested, and went to bed.  This morning, she notes increased swelling.  Pain is dull, mild to moderate, worst at the base of the ring finger, worse with grasping something and better at rest.  She denies numbness, weakness, chest pain, shortness of breath, any other pain or injuries.  On examination, heart sounds normal, lungs clear to auscultation, swelling and bruising noted to the dorsal aspect of the right hand, tender to palpation overlying the fourth metacarpal.  Strength sensation pulse and cap refill intact distal, although there is some ulnar deviation of the fourth finger noted.  Ring is in place, with mild constriction due to swelling of the fourth finger.  Plain films confirmed fracture of the distal fourth metacarpal, patient counts regarding same, I remove the ring myself using gentle traction, twisting, as well as surgery lube, ring was removed without injury or incident.  Splint by resident, discharged with strict  return precautions, follow-up with Ortho hand.  ED Course         Critical Care Time  Procedures

## 2025-02-24 NOTE — DISCHARGE INSTRUCTIONS
For pain relief:  You may take up to Tylenol 500-1000 mg 3 times a day (every 8 hrs). No more than 3 grams of Tylenol in a 24 hour period.    You may also take a non steroidal anti-inflammatory (NSAID) such as Motrin or Advil, 400mg every 6 hours. Please do not take this medication for more than 5 days without discussing with your primary care provider.    Taking both of these medications (alternating doses, for example: Tylenol at 9 am, Motrin at 12 pm, Tylenol at 3 pm, Motrin at 6 pm, etc) may help reduce the inflammation and best control your pain.    Please only take these medications if you are having symptoms. These are added as needed medications and this guidance is to assist you with safe dosing.

## 2025-02-27 ENCOUNTER — OFFICE VISIT (OUTPATIENT)
Dept: OBGYN CLINIC | Facility: CLINIC | Age: 75
End: 2025-02-27
Payer: MEDICARE

## 2025-02-27 VITALS — BODY MASS INDEX: 27.83 KG/M2 | HEIGHT: 60 IN | WEIGHT: 141.76 LBS

## 2025-02-27 DIAGNOSIS — T14.8XXA AVULSION FRACTURE: ICD-10-CM

## 2025-02-27 DIAGNOSIS — S62.304A CLOSED DISPLACED FRACTURE OF FOURTH METACARPAL BONE OF RIGHT HAND, UNSPECIFIED PORTION OF METACARPAL, INITIAL ENCOUNTER: Primary | ICD-10-CM

## 2025-02-27 DIAGNOSIS — S62.306A CLOSED NONDISPLACED FRACTURE OF FIFTH METACARPAL BONE OF RIGHT HAND, UNSPECIFIED PORTION OF METACARPAL, INITIAL ENCOUNTER: ICD-10-CM

## 2025-02-27 PROCEDURE — 29075 APPL CST ELBW FNGR SHORT ARM: CPT | Performed by: SURGERY

## 2025-02-27 PROCEDURE — 99204 OFFICE O/P NEW MOD 45 MIN: CPT | Performed by: SURGERY

## 2025-02-27 NOTE — PROGRESS NOTES
Lulu Ruffin M.D.  Attending, Orthopaedic Surgery  Hand, Wrist, and Elbow Surgery  [facility]      ORTHOPAEDIC HAND, WRIST, AND ELBOW OFFICE  VISIT       ASSESSMENT/PLAN:        Assessment & Plan  Closed displaced fracture of fourth metacarpal bone of right hand, unspecified portion of metacarpal, initial encounter  X-rays were performed in the office and reviewed demonstrating a ring finger metacarpal head fracture as well as a nondisplaced small finger metacarpal base fracture. She may also have a distal radius avulsion fracture.   We discussed that she may notice her ring finger sitting slightly abducted to her long finger once the fracture heals. No rotational deformity on PE.   We discussed immobilization for a total of 6 weeks.   She will be casted for approx. 4 weeks before being transitioned into a custom splint that will be made by OT.   She was placed into a modified ulnar gutter/short arm cast. She will be immobilized past the MP joints of the small and ring finger, PIP joints free.   Cast care and cast restrictions were reviewed.   I will see her back in 2 weeks time for cast removal and right hand and right wrist x-rays.     Closed nondisplaced fracture of fifth metacarpal bone of right hand, unspecified portion of metacarpal, initial encounter  X-rays were performed in the office and reviewed demonstrating a ring finger metacarpal head fracture as well as a nondisplaced small finger metacarpal base fracture. She may also have a distal radius avulsion fracture.   We discussed that she may notice her ring finger sitting slightly abducted to her long finger once the fracture heals. No rotational deformity on PE.   We discussed immobilization for a total of 6 weeks.   She will be casted for approx. 4 weeks before being transitioned into a custom splint that will be made by OT.   She was placed into a modified ulnar gutter/short arm cast. She will be immobilized past the MP  joints of the small and ring finger, PIP joints free.   Cast care and cast restrictions were reviewed.   I will see her back in 2 weeks time for cast removal and right hand and right wrist x-rays.     Avulsion fracture  X-rays were performed in the office and reviewed demonstrating a ring finger metacarpal head fracture as well as a nondisplaced small finger metacarpal base fracture. She may also have a distal radius avulsion fracture.   We discussed that she may notice her ring finger sitting slightly abducted to her long finger once the fracture heals. No rotational deformity on PE.   We discussed immobilization for a total of 6 weeks.   She will be casted for approx. 4 weeks before being transitioned into a custom splint that will be made by OT.   She was placed into a modified ulnar gutter/short arm cast. She will be immobilized past the MP joints of the small and ring finger, PIP joints free.   Cast care and cast restrictions were reviewed.   I will see her back in 2 weeks time for cast removal and right hand and right wrist x-rays.   Orders:    Cast application      The patient verbalized understanding of exam findings and treatment plan. We engaged in the shared decision-making process and treatment options were discussed at length with the patient. Surgical and conservative management discussed today along with risks and benefits.    Diagnoses and all orders for this visit:    Closed displaced fracture of fourth metacarpal bone of right hand, unspecified portion of metacarpal, initial encounter  -     Ambulatory Referral to Orthopedic Surgery  -     Cast application    Closed nondisplaced fracture of fifth metacarpal bone of right hand, unspecified portion of metacarpal, initial encounter  -     Ambulatory Referral to Orthopedic Surgery  -     Cast application    Avulsion fracture  -     Cast application      Follow Up:  Return in about 2 weeks (around 3/13/2025).    To Do Next Visit:  X-rays of the  right   wrist and hand and Cast/splint off prior to x-ray      General Discussions:  Fracture - Nonoperative Care: The physiology of a fractured bone was discussed with the patient today.  With non-displaced or minimally displaced fractures, conservative treatment such as casting or splinting often results in a functional recovery.  Typically, these fractures are immobilized in either a cast or splint depending on the pattern.  Radiographs are typically taken at intervals throughout the fracture healing to ensure that reduction or alignment is not lost.  If the fracture loses its alignment, surgical intervention may be required to stabilize it.  Medical conditions such as diabetes, osteoporosis, vitamin D deficiency, and a history of or exposure to smoking may delay or prevent fracture healing. Options between cast/splint immobilization and surgical treatment were offered and the risks and benefits of both were discussed.     ___________________________________________________________________________________________________________________________________________      CHIEF COMPLAINT:  Chief Complaint   Patient presents with    Right Hand - Fracture, Pain       SUBJECTIVE:  Barbara Cohen is a 74 y.o. year old RHD female who presents to the office for a right hand injury. She states on 2/24/25 she fell, hitting her hand. She presented to the ED after injury, at which time x-rays were performed and she was placed into a splint. She tolerated splinting well. She is not currently having to take anything for pain control.        Pain/symptom timing:  Worse during the day when active  Pain/symptom context:  Worse with activites and work  Pain/symptom modifying factors:  Rest makes better, activities make worse  Pain/symptom associated signs/symptoms: none    Prior treatment   NSAIDsNo   Injections No   Bracing/Orthotics Yes    Physical Therapy No     I have personally reviewed all the relevant PMH, PSH, SH, FH, Medications  "and allergies      PAST MEDICAL HISTORY:  Past Medical History:   Diagnosis Date    Mitral valve prolapse     \"cardiologist reports not any more\"       PAST SURGICAL HISTORY:  Past Surgical History:   Procedure Laterality Date     SECTION  79&83    x2    COLONOSCOPY      ROTATOR CUFF REPAIR Right        FAMILY HISTORY:  Family History   Problem Relation Age of Onset    Hypertension Mother     Hyperlipidemia Mother     Melanoma Mother     No Known Problems Daughter     No Known Problems Son     Varicose Veins Maternal Grandmother     Ovarian cancer Paternal Aunt 96    Breast cancer Neg Hx     Cervical cancer Neg Hx     Colon cancer Neg Hx     Endometrial cancer Neg Hx     Uterine cancer Neg Hx        SOCIAL HISTORY:  Social History     Tobacco Use    Smoking status: Never    Smokeless tobacco: Never   Vaping Use    Vaping status: Never Used   Substance Use Topics    Alcohol use: No    Drug use: No       MEDICATIONS:    Current Outpatient Medications:     BABY ASPIRIN PO, Take by mouth every 24 hours PT STATES TAKES BABY ASA EVERY OTHER DAY, Disp: , Rfl:     metoprolol succinate (TOPROL-XL) 25 mg 24 hr tablet, Take 25 mg by mouth (Patient taking differently: Take 25 mg by mouth PT taking 25 mg twice a day), Disp: , Rfl:     Multiple Vitamin (MULTIVITAMIN) tablet, Take 1 tablet by mouth daily, Disp: , Rfl:     Omega-3 Fatty Acids (FISH OIL OMEGA-3) 1000 MG CAPS, Take 1 g by mouth daily, Disp: , Rfl:     Red Yeast Rice 600 MG CAPS, Take 600 mg by mouth, Disp: , Rfl:     Flarex 0.1 % ophthalmic suspension, , Disp: , Rfl:     ALLERGIES:  Allergies   Allergen Reactions    Statins      DIZZY    Sulfa Antibiotics     Ezetimibe GI Intolerance           REVIEW OF SYSTEMS:  Review of Systems   Constitutional:  Negative for chills, fever and unexpected weight change.   HENT:  Negative for hearing loss, nosebleeds and sore throat.    Eyes:  Negative for pain, redness and visual disturbance.   Respiratory:  Negative for " cough, shortness of breath and wheezing.    Cardiovascular:  Negative for chest pain, palpitations and leg swelling.   Gastrointestinal:  Negative for abdominal pain, nausea and vomiting.   Endocrine: Negative for polydipsia and polyuria.   Genitourinary:  Negative for difficulty urinating and hematuria.   Musculoskeletal:  Negative for arthralgias, joint swelling and myalgias.   Skin:  Negative for rash and wound.   Neurological:  Negative for dizziness, numbness and headaches.   Psychiatric/Behavioral:  Negative for decreased concentration, dysphoric mood and suicidal ideas. The patient is not nervous/anxious.        VITALS:  There were no vitals filed for this visit.    LABS:      _____________________________________________________  PHYSICAL EXAMINATION:  General: well developed and well nourished, alert, oriented times 3, and appears comfortable  Psychiatric: Normal  HEENT: Normocephalic, Atraumatic Trachea Midline, No torticollis  Pulmonary: No audible wheezing or respiratory distress   Abdomen/GI: Non tender, non distended   Cardiovascular: No pitting edema, 2+ radial pulse   Skin: No masses, erythema, lacerations, fluctation, ulcerations  Neurovascular: Sensation Intact to the Median, Ulnar, Radial Nerve, Motor Intact to the Median, Ulnar, Radial Nerve, and Pulses Intact  Musculoskeletal: Normal, except as noted in detailed exam and in HPI.      MUSCULOSKELETAL EXAMINATION:    Right hand/wrist:     No erythema   Ecchymosis and edema noted   Tenderness over ring finger metacarpal head   Tenderness over small finger metacarpal base   Mild distal radius tenderness   Full digital extension   Ring finger sits slight abducted to the long finger   No rotational deformity noted   Near full composite fist     ___________________________________________________  STUDIES REVIEWED:  I have personally reviewed and interpreted  AP lateral and oblique radiographs of right hand which demonstrate displaced fourth metacarpal  "neck fracture mildly displaced, fifth metacarpal base fracture nondisplaced, dorsal radius possible avulsion fracture dedicated films at next visit      LABS REVIEWED:    HgA1c: No results found for: \"HGBA1C\"  BMP:   Lab Results   Component Value Date    GLUCOSE 86 06/20/2014    CALCIUM 9.0 10/23/2023     06/20/2014    K 4.8 10/23/2023    CO2 26 10/23/2023     10/23/2023    BUN 17 10/23/2023    CREATININE 0.88 10/23/2023               PROCEDURES PERFORMED:  Cast application    Date/Time: 2/27/2025 9:45 AM    Performed by: Lulu Ruffin MD  Authorized by: Lulu Ruffin MD    Other Assisting Provider: No    Verbal consent obtained?: Yes    Written consent obtained?: No    Risks and benefits: Risks, benefits and alternatives were discussed    Consent given by:  Patient  Patient states understanding of procedure being performed: Yes    Patient identity confirmed:  Verbally with patient  Pre-procedure details:     Sensation:  Normal  Procedure details:     Laterality:  Right    Location:  Hand    Hand:  R hand    Strapping: No      Cast type: modified ulnar gutter/short arm cast, imobilized past the MP joints of hte ring and small finger.    Supplies:  Cotton padding, 2 layer wrap, skin protective strip and fiberglass  Post-procedure details:     Sensation:  Normal    Patient tolerance of procedure:  Tolerated well, no immediate complications         _____________________________________________________      Scribe Attestation      I,:  Rufina Malone MA am acting as a scribe while in the presence of the attending physician.:       I,:  Lulu Ruffin MD personally performed the services described in this documentation    as scribed in my presence.:                   "

## 2025-03-13 ENCOUNTER — OFFICE VISIT (OUTPATIENT)
Dept: OBGYN CLINIC | Facility: CLINIC | Age: 75
End: 2025-03-13
Payer: MEDICARE

## 2025-03-13 ENCOUNTER — APPOINTMENT (OUTPATIENT)
Dept: RADIOLOGY | Facility: AMBULARY SURGERY CENTER | Age: 75
End: 2025-03-13
Attending: SURGERY
Payer: MEDICARE

## 2025-03-13 VITALS — HEIGHT: 61 IN | BODY MASS INDEX: 26.43 KG/M2 | WEIGHT: 140 LBS

## 2025-03-13 DIAGNOSIS — S62.346D CLOSED NONDISPLACED FRACTURE OF BASE OF FIFTH METACARPAL BONE OF RIGHT HAND WITH ROUTINE HEALING, SUBSEQUENT ENCOUNTER: ICD-10-CM

## 2025-03-13 DIAGNOSIS — S52.514D CLOSED NONDISPLACED FRACTURE OF STYLOID PROCESS OF RIGHT RADIUS WITH ROUTINE HEALING, SUBSEQUENT ENCOUNTER: ICD-10-CM

## 2025-03-13 DIAGNOSIS — S62.304A CLOSED DISPLACED FRACTURE OF FOURTH METACARPAL BONE OF RIGHT HAND, UNSPECIFIED PORTION OF METACARPAL, INITIAL ENCOUNTER: ICD-10-CM

## 2025-03-13 DIAGNOSIS — S62.334D CLOSED DISPLACED FRACTURE OF NECK OF FOURTH METACARPAL BONE OF RIGHT HAND WITH ROUTINE HEALING, SUBSEQUENT ENCOUNTER: Primary | ICD-10-CM

## 2025-03-13 PROCEDURE — 73110 X-RAY EXAM OF WRIST: CPT

## 2025-03-13 PROCEDURE — 99213 OFFICE O/P EST LOW 20 MIN: CPT | Performed by: SURGERY

## 2025-03-13 PROCEDURE — 29075 APPL CST ELBW FNGR SHORT ARM: CPT | Performed by: PHYSICIAN ASSISTANT

## 2025-03-13 NOTE — PROGRESS NOTES
Lulu Ruffin M.D.  Attending, Orthopaedic Surgery  Hand, Wrist, and Elbow Surgery  [facility]      ORTHOPAEDIC HAND, WRIST, AND ELBOW OFFICE  VISIT       ASSESSMENT/PLAN:        Assessment & Plan  Closed displaced fracture of neck of fourth metacarpal bone of right hand with routine healing, subsequent encounter  X-rays taken today demonstrating stable alignment of fractures  I do see a radial styloid fracture on the new set of films which was suspected at her last visit   We will keep her in a cast for another 2 weeks, new cast placed today   Will transition to a custom splint at her next visit   Cast restrictions reviewed    Orders:    XR wrist 3+ vw right; Future    Cast application    Closed nondisplaced fracture of base of fifth metacarpal bone of right hand with routine healing, subsequent encounter  See above plan     Orders:    Cast application    Closed nondisplaced fracture of styloid process of right radius with routine healing, subsequent encounter  See above plan     Orders:    Cast application             The patient verbalized understanding of exam findings and treatment plan. We engaged in the shared decision-making process and treatment options were discussed at length with the patient. Surgical and conservative management discussed today along with risks and benefits.      Follow Up:  Return in about 2 weeks (around 3/27/2025).    To Do Next Visit:  Re-evaluation of current issue, cast off, xrays of right hand (wide to include wrist)      ____________________________________________________________________________________________________________________________________________      CHIEF COMPLAINT:  No chief complaint on file.      SUBJECTIVE:  Barbara Cohen is a 74 y.o. year old RHD female who presents for follow up evaluation of multiple right hand fractures and a right wrist fracture. Patient has been treating in a cast since her last visit and tolerating it well  "however she does have some claustrophobia when in it. No paresthesias or pain.      I have personally reviewed all the relevant PMH, PSH, SH, FH, Medications and allergies      PAST MEDICAL HISTORY:  Past Medical History:   Diagnosis Date    Mitral valve prolapse     \"cardiologist reports not any more\"       PAST SURGICAL HISTORY:  Past Surgical History:   Procedure Laterality Date     SECTION  79&83    x2    COLONOSCOPY      ROTATOR CUFF REPAIR Right        FAMILY HISTORY:  Family History   Problem Relation Age of Onset    Hypertension Mother     Hyperlipidemia Mother     Melanoma Mother     No Known Problems Daughter     No Known Problems Son     Varicose Veins Maternal Grandmother     Ovarian cancer Paternal Aunt 96    Breast cancer Neg Hx     Cervical cancer Neg Hx     Colon cancer Neg Hx     Endometrial cancer Neg Hx     Uterine cancer Neg Hx        SOCIAL HISTORY:  Social History     Tobacco Use    Smoking status: Never    Smokeless tobacco: Never   Vaping Use    Vaping status: Never Used   Substance Use Topics    Alcohol use: No    Drug use: No       MEDICATIONS:    Current Outpatient Medications:     BABY ASPIRIN PO, Take by mouth every 24 hours PT STATES TAKES BABY ASA EVERY OTHER DAY, Disp: , Rfl:     metoprolol succinate (TOPROL-XL) 25 mg 24 hr tablet, Take 25 mg by mouth, Disp: , Rfl:     Multiple Vitamin (MULTIVITAMIN) tablet, Take 1 tablet by mouth daily, Disp: , Rfl:     Omega-3 Fatty Acids (FISH OIL OMEGA-3) 1000 MG CAPS, Take 1 g by mouth daily, Disp: , Rfl:     Red Yeast Rice 600 MG CAPS, Take 600 mg by mouth, Disp: , Rfl:     Flarex 0.1 % ophthalmic suspension, , Disp: , Rfl:     ALLERGIES:  Allergies   Allergen Reactions    Statins      DIZZY    Sulfa Antibiotics     Ezetimibe GI Intolerance           REVIEW OF SYSTEMS:  Review of Systems   Constitutional:  Negative for chills and fever.   HENT:  Negative for ear pain and sore throat.    Eyes:  Negative for pain and visual disturbance. " "  Respiratory:  Negative for cough and shortness of breath.    Cardiovascular:  Negative for chest pain and palpitations.   Gastrointestinal:  Negative for abdominal pain and vomiting.   Genitourinary:  Negative for dysuria and hematuria.   Musculoskeletal:  Negative for arthralgias and back pain.   Skin:  Negative for color change and rash.   Neurological:  Negative for seizures and syncope.   All other systems reviewed and are negative.      VITALS:  There were no vitals filed for this visit.    LABS:      _____________________________________________________  PHYSICAL EXAMINATION:  General: well developed and well nourished, alert, oriented times 3, and appears comfortable  Psychiatric: Normal  HEENT: Normocephalic, Atraumatic Trachea Midline, No torticollis  Pulmonary: No audible wheezing or respiratory distress   Abdomen/GI: Non tender, non distended   Cardiovascular: No pitting edema, 2+ radial pulse   Skin: No masses, erythema, lacerations, fluctation, ulcerations  Neurovascular: Sensation Intact to the Median, Ulnar, Radial Nerve, Motor Intact to the Median, Ulnar, Radial Nerve, and Pulses Intact  Musculoskeletal: Normal, except as noted in detailed exam and in HPI.      MUSCULOSKELETAL EXAMINATION:  Right wrist/hand:     Skin intact  No significant edema  Improving ecchymosis  Tenderness at fracture sites  Digital motion intact     ___________________________________________________  STUDIES REVIEWED:  I have personally reviewed and interpreted  AP lateral and oblique radiographs of the right hand   which demonstrate stable alignment of 4th and 5th MC fractures, there is a lucency at the radial styloid visible on multiple views consistent with radial styloid fracture       LABS REVIEWED:    HgA1c: No results found for: \"HGBA1C\"  BMP:   Lab Results   Component Value Date    GLUCOSE 86 06/20/2014    CALCIUM 9.0 10/23/2023     06/20/2014    K 4.8 10/23/2023    CO2 26 10/23/2023     10/23/2023    " BUN 17 10/23/2023    CREATININE 0.88 10/23/2023               PROCEDURES PERFORMED:  Cast application    Date/Time: 3/13/2025 9:30 AM    Performed by: Lakeisha Landry PA-C  Authorized by: Lulu Ruffin MD    Other Assisting Provider: No    Verbal consent obtained?: Yes    Risks and benefits: Risks, benefits and alternatives were discussed    Consent given by:  Patient  Patient states understanding of procedure being performed: Yes    Site marked: Yes    Radiology Images displayed and confirmed. If images not available, report reviewed: Yes    Required items: Required blood products, implants, devices and special equipment available    Patient identity confirmed:  Verbally with patient  Pre-procedure details:     Sensation:  Normal  Procedure details:     Laterality:  Right    Location:  Hand    Hand:  R hand    Cast type:  Short arm    Splint composition: static      Supplies:  Cotton padding and fiberglass  Post-procedure details:     Pain:  Unchanged    Sensation:  Normal    Patient tolerance of procedure:  Tolerated well, no immediate complications    -    _____________________________________________________  Lakeisha Landry

## 2025-03-27 ENCOUNTER — OFFICE VISIT (OUTPATIENT)
Dept: OBGYN CLINIC | Facility: CLINIC | Age: 75
End: 2025-03-27
Payer: COMMERCIAL

## 2025-03-27 ENCOUNTER — OFFICE VISIT (OUTPATIENT)
Dept: OCCUPATIONAL THERAPY | Facility: CLINIC | Age: 75
End: 2025-03-27
Payer: COMMERCIAL

## 2025-03-27 ENCOUNTER — APPOINTMENT (OUTPATIENT)
Dept: RADIOLOGY | Facility: AMBULARY SURGERY CENTER | Age: 75
End: 2025-03-27
Attending: SURGERY
Payer: COMMERCIAL

## 2025-03-27 VITALS — WEIGHT: 140 LBS | HEIGHT: 61 IN | BODY MASS INDEX: 26.43 KG/M2

## 2025-03-27 DIAGNOSIS — S62.334D CLOSED DISPLACED FRACTURE OF NECK OF FOURTH METACARPAL BONE OF RIGHT HAND WITH ROUTINE HEALING, SUBSEQUENT ENCOUNTER: ICD-10-CM

## 2025-03-27 DIAGNOSIS — S62.334D CLOSED DISPLACED FRACTURE OF NECK OF FOURTH METACARPAL BONE OF RIGHT HAND WITH ROUTINE HEALING, SUBSEQUENT ENCOUNTER: Primary | ICD-10-CM

## 2025-03-27 DIAGNOSIS — S62.346D CLOSED NONDISPLACED FRACTURE OF BASE OF FIFTH METACARPAL BONE OF RIGHT HAND WITH ROUTINE HEALING, SUBSEQUENT ENCOUNTER: ICD-10-CM

## 2025-03-27 PROCEDURE — L3913 HFO W/O JOINTS CF: HCPCS

## 2025-03-27 PROCEDURE — 73130 X-RAY EXAM OF HAND: CPT

## 2025-03-27 PROCEDURE — 99213 OFFICE O/P EST LOW 20 MIN: CPT | Performed by: SURGERY

## 2025-03-27 NOTE — PATIENT INSTRUCTIONS
Patient at this time can transition to a custom made ulnar gutter splint past MP joints of the right small and ring fingers.  She is to wear that for additional 2 weeks.  Referral placed for the splint and also start OT for motion.  We will follow-up with the patient in 3-4 weeks for reevaluation and new x-rays.

## 2025-03-27 NOTE — PROGRESS NOTES
Orthosis    Diagnosis:   1. Closed displaced fracture of neck of fourth metacarpal bone of right hand with routine healing, subsequent encounter  Ambulatory Referral to PT/OT Hand Therapy      2. Closed nondisplaced fracture of base of fifth metacarpal bone of right hand with routine healing, subsequent encounter  Ambulatory Referral to PT/OT Hand Therapy        Indication: Fracture    Location: Right  hand, ring finger, and small finger  Supplies: Custom Fit Orthotic  Orthosis type: Ulnar Gutter Hand Based  Wearing Schedule: Remove for hygiene only  Describe Position: MCP blocked, PIP/DIP free    Precautions: Fracture    Patient or Caregiver expresses understanding of wearing Schedule and Precautions? Yes  Patient or Caregiver able to don/doff orthotic independently?Yes    Written orders provided to patient? Yes  Orders Obtained: Written  Orders Obtained from: Dr. Ruffin    Return for evaluation and treatment Yes, scheduled

## 2025-03-27 NOTE — PROGRESS NOTES
ASSESSMENT/PLAN:      Assessment & Plan  Closed displaced fracture of neck of fourth metacarpal bone of right hand with routine healing, subsequent encounter  New x-rays of the right hand were obtained today and reviewed with the patient noting that she is three fourths on the way healed.  On exam patient has some mild stiffness with flexion which can be improved upon with some occupational therapy.  Patient at this time can transition to a custom made ulnar gutter splint past MP joints of the right small and ring fingers.  She is to wear that for additional 2 weeks.  Referral placed for the splint and also start OT for motion.  We will follow-up with the patient in 3-4 weeks for reevaluation and new x-rays.  Orders:    XR hand 3+ vw right; Future    Ambulatory Referral to PT/OT Hand Therapy; Future    Closed nondisplaced fracture of base of fifth metacarpal bone of right hand with routine healing, subsequent encounter  New x-rays of the right hand were obtained today and reviewed with the patient noting that she is three fourths on the way healed.  On exam patient has some mild stiffness with flexion which can be improved upon with some occupational therapy.  Patient at this time can transition to a custom made ulnar gutter splint past MP joints of the right small and ring fingers.  She is to wear that for additional 2 weeks.  Referral placed for the splint and also start OT for motion.  We will follow-up with the patient in 3-4 weeks for reevaluation and new x-rays.  Orders:    Ambulatory Referral to PT/OT Hand Therapy; Future        The patient verbalized understanding of exam findings and treatment plan. We engaged in the shared decision-making process and treatment options were discussed at length with the patient. Surgical and conservative management discussed today along with risks and benefits.    Diagnoses and all orders for this visit:    Closed displaced fracture of neck of fourth metacarpal bone of right  "hand with routine healing, subsequent encounter  -     XR hand 3+ vw right; Future  -     Ambulatory Referral to PT/OT Hand Therapy; Future    Closed nondisplaced fracture of base of fifth metacarpal bone of right hand with routine healing, subsequent encounter  -     Ambulatory Referral to PT/OT Hand Therapy; Future          Follow Up:  Return for Follow-up with 3- 4 weeks right hand.      To Do Next Visit:  Re-evaluation of current issue and X-rays of the  right  hand    ____________________________________________________________________________________________________________________________________________      CHIEF COMPLAINT:  No chief complaint on file.      SUBJECTIVE:  Barbara Cohen is a 74 y.o. year old RHD female who presents today for a 2-week follow-up for her closed nondisplaced base of fifth metacarpal fracture and closed displaced fracture of neck fourth metacarpal, date of injury 2025.       I have personally reviewed all the relevant PMH, PSH, SH, FH, Medications and allergies.     PAST MEDICAL HISTORY:  Past Medical History:   Diagnosis Date    Mitral valve prolapse     \"cardiologist reports not any more\"       PAST SURGICAL HISTORY:  Past Surgical History:   Procedure Laterality Date     SECTION  79&83    x2    COLONOSCOPY      ROTATOR CUFF REPAIR Right        FAMILY HISTORY:  Family History   Problem Relation Age of Onset    Hypertension Mother     Hyperlipidemia Mother     Melanoma Mother     No Known Problems Daughter     No Known Problems Son     Varicose Veins Maternal Grandmother     Ovarian cancer Paternal Aunt 96    Breast cancer Neg Hx     Cervical cancer Neg Hx     Colon cancer Neg Hx     Endometrial cancer Neg Hx     Uterine cancer Neg Hx        SOCIAL HISTORY:  Social History     Tobacco Use    Smoking status: Never    Smokeless tobacco: Never   Vaping Use    Vaping status: Never Used   Substance Use Topics    Alcohol use: No    Drug use: No "       MEDICATIONS:    Current Outpatient Medications:     BABY ASPIRIN PO, Take by mouth every 24 hours PT STATES TAKES BABY ASA EVERY OTHER DAY, Disp: , Rfl:     metoprolol succinate (TOPROL-XL) 25 mg 24 hr tablet, Take 25 mg by mouth, Disp: , Rfl:     Multiple Vitamin (MULTIVITAMIN) tablet, Take 1 tablet by mouth daily, Disp: , Rfl:     Omega-3 Fatty Acids (FISH OIL OMEGA-3) 1000 MG CAPS, Take 1 g by mouth daily, Disp: , Rfl:     Red Yeast Rice 600 MG CAPS, Take 600 mg by mouth, Disp: , Rfl:     Flarex 0.1 % ophthalmic suspension, , Disp: , Rfl:     ALLERGIES:  Allergies   Allergen Reactions    Statins      DIZZY    Sulfa Antibiotics     Ezetimibe GI Intolerance       REVIEW OF SYSTEMS:  Review of Systems   Constitutional:  Negative for chills, fever and unexpected weight change.   HENT:  Negative for hearing loss, nosebleeds and sore throat.    Eyes:  Negative for pain, redness and visual disturbance.   Respiratory:  Negative for cough, shortness of breath and wheezing.    Cardiovascular:  Negative for chest pain, palpitations and leg swelling.   Gastrointestinal:  Negative for abdominal pain and nausea.   Genitourinary:  Negative for dyspareunia, dysuria and frequency.   Skin:  Negative for rash and wound.   Neurological:  Negative for dizziness, numbness and headaches.   Psychiatric/Behavioral:  Negative for decreased concentration and suicidal ideas. The patient is not nervous/anxious.        VITALS:  There were no vitals filed for this visit.      _____________________________________________________  PHYSICAL EXAMINATION:  General: well developed and well nourished, alert, oriented times 3, and appears comfortable  Psychiatric: Normal  HEENT: Normocephalic, Atraumatic Trachea Midline, No torticollis  Pulmonary: No audible wheezing or respiratory distress   Cardiovascular: No pitting edema, 2+ radial pulse   Abdominal/GI: abdomen non tender, non distended   Skin: No masses, erythema, lacerations, fluctation,  "ulcerations  Neurovascular: Sensation Intact to the Median, Ulnar, Radial Nerve, Motor Intact to the Median, Ulnar, Radial Nerve, and Pulses Intact  Musculoskeletal: Normal, except as noted in detailed exam and in HPI.      MUSCULOSKELETAL EXAMINATION:  Right hand:    No tenderness at the base of the 5th metacarpal   Mild tenderness at the neck of 4th metacarpal   Full composite fist although not firm  Full extension of fingers  Sensation intact to light touch distally  Brisk capillary refill   ___________________________________________________    STUDIES REVIEWED:  I have personally reviewed and interpreted  AP lateral and oblique radiographs of xrays of right hand 3 views  which demonstrate progressive healing fifth metacarpal base, 4th metacarpal neck fracture        LABS REVIEWED:    HgA1c: No results found for: \"HGBA1C\"  BMP:   Lab Results   Component Value Date    GLUCOSE 86 06/20/2014    CALCIUM 9.0 10/23/2023     06/20/2014    K 4.8 10/23/2023    CO2 26 10/23/2023     10/23/2023    BUN 17 10/23/2023    CREATININE 0.88 10/23/2023             PROCEDURES PERFORMED:  Procedures  No Procedures performed today    _____________________________________________________      Scribe Attestation      I,:  Desi Redman am acting as a scribe while in the presence of the attending physician.:       I,:  Lulu Ruffin MD personally performed the services described in this documentation    as scribed in my presence.:                  "

## 2025-03-31 ENCOUNTER — EVALUATION (OUTPATIENT)
Dept: OCCUPATIONAL THERAPY | Facility: CLINIC | Age: 75
End: 2025-03-31
Payer: COMMERCIAL

## 2025-03-31 DIAGNOSIS — S62.334D CLOSED DISPLACED FRACTURE OF NECK OF FOURTH METACARPAL BONE OF RIGHT HAND WITH ROUTINE HEALING, SUBSEQUENT ENCOUNTER: Primary | ICD-10-CM

## 2025-03-31 DIAGNOSIS — S62.346D CLOSED NONDISPLACED FRACTURE OF BASE OF FIFTH METACARPAL BONE OF RIGHT HAND WITH ROUTINE HEALING, SUBSEQUENT ENCOUNTER: ICD-10-CM

## 2025-03-31 PROCEDURE — 97110 THERAPEUTIC EXERCISES: CPT

## 2025-03-31 PROCEDURE — 97165 OT EVAL LOW COMPLEX 30 MIN: CPT

## 2025-03-31 NOTE — PROGRESS NOTES
OT Evaluation     Today's date: 3/31/2025  Patient name: Barbara Cohen  : 1950  MRN: [id number]  Referring provider: Lulu Ruffin MD  Dx:   Encounter Diagnosis     ICD-10-CM    1. Closed displaced fracture of neck of fourth metacarpal bone of right hand with routine healing, subsequent encounter  S62.334D       2. Closed nondisplaced fracture of base of fifth metacarpal bone of right hand with routine healing, subsequent encounter  S62.346D                      Assessment  Impairments: abnormal or restricted ROM, abnormal movement, activity intolerance, impaired physical strength, lacks appropriate home exercise program, pain with function and weight-bearing intolerance  Symptom irritability: low    Assessment details: Patient presents to initial evaluation due to a diagnosis of a a 4th and 5th metacarpal fracture. Patient initial injury occurred on 25 as the result of a fall. Patient went to the ED following the injury and x-rays displayed the fracture. Patient was placed in a splint at this time. Patient initial appointment with orthopedics took place on 24 where x-rays confirmed the fracture. Patient was placed in a cast at this time for 4 weeks. Patient had a follow up with orthopedics on 3/27/25 where the cast was discontinued and OT fabricated an ulnar gutter to be worth for 2 weeks. Patient's ring finger is slightly abducted to her long finger. No rotational deformity noted. Patient presents with decreased 4th and 5th digit AROM, but it is significantly better than anticipated and they can form a loose composite fist. Wrist AROM is decreased due to immobilization and general stiffness. Pain is reported to be mild in intensity during general ROM. Patient reports no numbness or tinging in the affected upper extremity. Therapist deferred /pinch strength secondary to healing timeline. Will access as able. Patient was provided a custom HEP to address functional ROM. See below for  "a more detailed assessment.     Goals  STG:    Patient will display the ability to form a tight full composite fist in 2 weeks    Patient will increase wrist ROM in all planes by an arch of motion of >30 degrees in 4 weeks    Patient will report decreased pain by 1-2 grades in the digits during functional movement in 4 weeks    Patient will be cleared by orthopedics to begin gentle strengthening in 4 weeks    LTG:    Patient will display ROM WFL in the wrist in 8 weeks or discharge    Patient will display full digit AROM in 8 weeks or discharge    Patient will report resolution of pain during all activities in 8 weeks    Patient will increase FOTO score by >20 points in 8 weeks or discharge    Plan  Patient would benefit from: custom splinting and OT eval  Referral necessary: No  Planned modality interventions: ultrasound, thermotherapy: paraffin bath, thermotherapy: hydrocollator packs and TENS    Planned therapy interventions: IASTM, joint mobilization, manual therapy, massage, orthotic fitting/training, patient education, strengthening, stretching, therapeutic activities, therapeutic exercise, home exercise program, functional ROM exercises, coordination and ADL retraining    Frequency: 2x week  Duration in weeks: 10  Plan of Care beginning date: 3/31/2025  Plan of Care expiration date: 5/31/2025  Treatment plan discussed with: patient  Plan details: Patient would benefit from skilled OP OT hand therapy services 2x per week for 8 weeks to increase ROM and return to full functional status.         Subjective Evaluation    History of Present Illness  Date of onset: 2/24/2025  Mechanism of injury: trauma  Mechanism of injury: Mechanism: Mechanical fall    Subjective:  \"Anything with a twist is still difficult\". \"Door knobs and can openings are hard\". \"Pulling is hard\". \"The splint has been super\". \"I have no pain with the splint\". \"My wrist doesn't feel great\". \"I have no sharp pain\". \"If I have discomfort I adapt\". " "\"Writing has been difficult\". \"I keep the splint on\".               Not a recurrent problem   Quality of life: good    Patient Goals  Patient goals for therapy: decreased edema, decreased pain, increased motion, increased strength, independence with ADLs/IADLs and return to sport/leisure activities    Pain  Current pain ratin  At best pain ratin  At worst pain ratin  Location: R Wrist and 4th and 5th digits  Quality: dull ache and discomfort  Exacerbated by: Forarm rotation, general motion.  Progression: improved    Social Support    Employment status: not working  Hand dominance: right      Diagnostic Tests  X-ray: abnormal  Treatments  Previous treatment: immobilization  Current treatment: immobilization and occupational therapy        Objective     Observations     Additional Observation Details  R:  Slight abduction of 4th digit    Tenderness     Additional Tenderness Details  R:  Non-tender to the touch    Active Range of Motion     Left Elbow   Normal active range of motion    Right Elbow   Normal active range of motion    Left Wrist   Wrist flexion: 80 degrees   Wrist extension: 60 degrees   Radial deviation: 25 degrees   Ulnar deviation: 30 degrees     Right Wrist   Wrist flexion: 68 degrees   Wrist extension: 58 degrees   Radial deviation: 10 degrees   Ulnar deviation: 10 degrees     Left Thumb   Kapandji score: 10 degrees      Right Thumb   Kapandji score: 10 degrees    Left Digits    Flexion   Ring     MCP: 90    PIP: 92    DIP: 76  Little     MCP: 92    PIP: 82    DIP: 78    Right Digits   Flexion   Ring     MCP: 70    PIP: 86    DIP: 72  Little     MCP: 72    PIP: 78    DIP: 82    Swelling     Left Wrist/Hand   Ring     Proximal: 5.2 cm  Little     Proximal: 5 cm  Circumference MCP: 18 cm  Circumference wrist: 15.2 cm    Right Wrist/Hand   Ring     Proximal: 5.8 cm  Little     Proximal: 5.2 cm  Circumference MCP: 18.3 cm  Circumference wrist: 15.1 cm           Precautions: 4th and 5th " Metacarpal Fx  DOI: 2/24/25      Manuals 3/31            STM                                                    Neuro Re-Ed                                                                                                        Ther Ex             HEP Digit AROM    Tendon glides    Abduction Digit            Digit AROM             Dex balls             Pencil grasps             Pencil push ups             Hook fist pegs             Reverse blocking                                                    Ther Activity             Keypegs                                                                 Modalities             P

## 2025-04-03 ENCOUNTER — PREP FOR PROCEDURE (OUTPATIENT)
Age: 75
End: 2025-04-03

## 2025-04-03 ENCOUNTER — TELEPHONE (OUTPATIENT)
Age: 75
End: 2025-04-03

## 2025-04-03 DIAGNOSIS — Z12.11 SCREENING FOR COLON CANCER: Primary | ICD-10-CM

## 2025-04-03 NOTE — TELEPHONE ENCOUNTER
04/03/25  Screened by: Tracie Collins    Referring Provider     Pre- Screening:     There is no height or weight on file to calculate BMI.  Has patient been referred for a routine screening Colonoscopy? yes  Is the patient between 45-75 years old? yes      Previous Colonoscopy yes   If yes:    Date: 10 years ago    Facility: [facility]    Reason: Screening          Does the patient want to see a Gastroenterologist prior to their procedure OR are they having any GI symptoms? no    Has the patient been hospitalized or had abdominal surgery in the past 6 months? no    Does the patient use supplemental oxygen? no    Does the patient take Coumadin, Lovenox, Plavix, Elliquis, Xarelto, or other blood thinning medication? no    Has the patient had a stroke, cardiac event, or stent placed in the past year? no        If patient is between 45yrs - 49yrs, please advise patient that we will have to confirm benefits & coverage with their insurance company for a routine screening colonoscopy.    
Scheduled date of colonoscopy (as of today):5/6/2025  Physician performing colonoscopy:   [facility] of colonoscopy: [facility]  Bowel prep reviewed with patient: Tracie Collins  Instructions reviewed with patient by: Tracie Collins  Clearances: N/A      Giancarlo Dul prep send via mail  
anesthesia achieved with dorsal penile block. 21g butterfly inserted into right corpora cavernosum with drainage of blood followed by insertion into left corpora cavernosum. Cavernosum irrigated with 20cc of sterile saline followed by aspiration. ~60cc of blood aspirated. Pt notes improved pain. Priapism detumesced to significant extent. Pressure bandage applied with additional manual pressure.

## 2025-04-07 ENCOUNTER — OFFICE VISIT (OUTPATIENT)
Dept: OCCUPATIONAL THERAPY | Facility: CLINIC | Age: 75
End: 2025-04-07
Payer: COMMERCIAL

## 2025-04-07 DIAGNOSIS — S62.334D CLOSED DISPLACED FRACTURE OF NECK OF FOURTH METACARPAL BONE OF RIGHT HAND WITH ROUTINE HEALING, SUBSEQUENT ENCOUNTER: Primary | ICD-10-CM

## 2025-04-07 DIAGNOSIS — S62.346D CLOSED NONDISPLACED FRACTURE OF BASE OF FIFTH METACARPAL BONE OF RIGHT HAND WITH ROUTINE HEALING, SUBSEQUENT ENCOUNTER: ICD-10-CM

## 2025-04-07 PROCEDURE — 97530 THERAPEUTIC ACTIVITIES: CPT

## 2025-04-07 PROCEDURE — 97110 THERAPEUTIC EXERCISES: CPT

## 2025-04-07 NOTE — PROGRESS NOTES
"Daily Note     Today's date: 2025  Patient name: Barbara Cohen  : 1950  MRN: [id number]  Referring provider: Lulu Ruffin MD  Dx:   Encounter Diagnosis     ICD-10-CM    1. Closed displaced fracture of neck of fourth metacarpal bone of right hand with routine healing, subsequent encounter  S62.334D       2. Closed nondisplaced fracture of base of fifth metacarpal bone of right hand with routine healing, subsequent encounter  S62.346D                      Subjective: \"It is moving well\".       Objective: See treatment diary below      Assessment: Tolerated treatment well. Patient displays a full composite fist without pain. The 4th digit continues to abduct towards the 5th, but it is passively correctable.       Plan: Continue per plan of care.  Progress treatment as tolerated.  Discontinue splint at 2 weeks as per ortho     Precautions: 4th and 5th Metacarpal Fx  DOI: 25      Manuals 3/31 4/07                                                               Neuro Re-Ed                                                                                                        Ther Ex             HEP Digit AROM    Tendon glides    Abduction Digit            Digit AROM             Dex balls  2'           Pencil grasps  x1           Pencil push ups  x20           Hook fist pegs  x5           Abduction pegs  x5           Reverse blocking             Tabletop extension  x20                                     Ther Activity             Keypegs  X1 translation                                                               Modalities             P  5'                             "

## 2025-04-14 ENCOUNTER — OFFICE VISIT (OUTPATIENT)
Dept: OCCUPATIONAL THERAPY | Facility: CLINIC | Age: 75
End: 2025-04-14
Payer: COMMERCIAL

## 2025-04-14 DIAGNOSIS — S62.334D CLOSED DISPLACED FRACTURE OF NECK OF FOURTH METACARPAL BONE OF RIGHT HAND WITH ROUTINE HEALING, SUBSEQUENT ENCOUNTER: Primary | ICD-10-CM

## 2025-04-14 DIAGNOSIS — S62.346D CLOSED NONDISPLACED FRACTURE OF BASE OF FIFTH METACARPAL BONE OF RIGHT HAND WITH ROUTINE HEALING, SUBSEQUENT ENCOUNTER: ICD-10-CM

## 2025-04-14 PROCEDURE — 97140 MANUAL THERAPY 1/> REGIONS: CPT

## 2025-04-14 PROCEDURE — 97110 THERAPEUTIC EXERCISES: CPT

## 2025-04-14 NOTE — PROGRESS NOTES
"Daily Note     Today's date: 2025  Patient name: Barbara Cohen  : 1950  MRN: [id number]  Referring provider: Lulu Ruffin MD  Dx:   Encounter Diagnosis     ICD-10-CM    1. Closed displaced fracture of neck of fourth metacarpal bone of right hand with routine healing, subsequent encounter  S62.334D       2. Closed nondisplaced fracture of base of fifth metacarpal bone of right hand with routine healing, subsequent encounter  S62.346D                      Subjective: \"It is moving well\".       Objective: See treatment diary below      Assessment: Tolerated treatment well. Patient can continue to form a full composite fist. Will continue to progress treatment as able.       Plan: Continue per plan of care.  Progress treatment as tolerated.       Precautions: 4th and 5th Metacarpal Fx  DOI: 25      Manuals 3/31 4/07 4/14          STM   8                                                 Neuro Re-Ed                                                                                                        Ther Ex             HEP Digit AROM    Tendon glides    Abduction Digit            Digit AROM>AAROM>PROM             Dex balls  2' 2'          Pencil grasps  x1 x1          Pencil push ups  x20 x30          Hook fist pegs  x5 x5          Abduction pegs  x5 x5          Reverse blocking             Tabletop extension  x20 x20                                    Ther Activity             Keypegs  X1 translation  X1 Translation                                                               Modalities             [facility]  5' 5'                            "

## 2025-04-17 ENCOUNTER — OFFICE VISIT (OUTPATIENT)
Dept: OBGYN CLINIC | Facility: CLINIC | Age: 75
End: 2025-04-17
Payer: COMMERCIAL

## 2025-04-17 ENCOUNTER — APPOINTMENT (OUTPATIENT)
Dept: RADIOLOGY | Facility: AMBULARY SURGERY CENTER | Age: 75
End: 2025-04-17
Attending: SURGERY
Payer: COMMERCIAL

## 2025-04-17 VITALS — WEIGHT: 140 LBS | HEIGHT: 61 IN | BODY MASS INDEX: 26.43 KG/M2

## 2025-04-17 DIAGNOSIS — S62.346D CLOSED NONDISPLACED FRACTURE OF BASE OF FIFTH METACARPAL BONE OF RIGHT HAND WITH ROUTINE HEALING, SUBSEQUENT ENCOUNTER: ICD-10-CM

## 2025-04-17 DIAGNOSIS — S62.334D CLOSED DISPLACED FRACTURE OF NECK OF FOURTH METACARPAL BONE OF RIGHT HAND WITH ROUTINE HEALING, SUBSEQUENT ENCOUNTER: ICD-10-CM

## 2025-04-17 DIAGNOSIS — S62.334D CLOSED DISPLACED FRACTURE OF NECK OF FOURTH METACARPAL BONE OF RIGHT HAND WITH ROUTINE HEALING, SUBSEQUENT ENCOUNTER: Primary | ICD-10-CM

## 2025-04-17 PROCEDURE — 73130 X-RAY EXAM OF HAND: CPT

## 2025-04-17 PROCEDURE — 99213 OFFICE O/P EST LOW 20 MIN: CPT | Performed by: SURGERY

## 2025-04-17 NOTE — PROGRESS NOTES
ASSESSMENT/PLAN:      Assessment & Plan  Closed displaced fracture of neck of fourth metacarpal bone of right hand with routine healing, subsequent encounter  DOI 2/24/25  X-rays were performed in the office and reviewed, fractures are healed.   She may gradually resume activities to her tolerance. Advised to hold off on push ups and pull ups for a few more weeks.   We discussed her ring finger will likely also sit more abducted secondary to the ring finger fracture.   She may continue OT and transition to a HEP.   I will see her back in the office as needed if symptoms worsen or fail to improve.   Orders:    XR hand 3+ vw right; Future    Closed nondisplaced fracture of base of fifth metacarpal bone of right hand with routine healing, subsequent encounter  DOI 2/24/25  X-rays were performed in the office and reviewed, fractures are healed.   She may gradually resume activities to her tolerance. Advised to hold off on push ups and pull ups for a few more week.   We discussed her ring finger will likely also sit more abducted secondary to the ring finger fracture.   She may continue OT and transition to a HEP.   I will see her back in the office as needed if symptoms worsen or fail to improve.          The patient verbalized understanding of exam findings and treatment plan. We engaged in the shared decision-making process and treatment options were discussed at length with the patient. Surgical and conservative management discussed today along with risks and benefits.    Diagnoses and all orders for this visit:    Closed displaced fracture of neck of fourth metacarpal bone of right hand with routine healing, subsequent encounter  -     XR hand 3+ vw right; Future    Closed nondisplaced fracture of base of fifth metacarpal bone of right hand with routine healing, subsequent encounter      Follow Up:  Return if symptoms worsen or fail to improve.      To Do Next  "Visit:      ____________________________________________________________________________________________________________________________________________      CHIEF COMPLAINT:  Chief Complaint   Patient presents with    Follow-up     Patient is doing well she has some stiffness no pain       SUBJECTIVE:  Barbara Cohen is a 74 y.o. year old RHD female who presents to the office for a follow up regarding right 5th and 4th metacarpal fractures, DOI 25. Overall Barbara is doing well. She d/c the use of the custom ulnar gutter splint last week on the . She denies pain. She notes some stiffness at times. She continues to attend therapy with improvement. She is not having to take anything for pain control.        I have personally reviewed all the relevant PMH, PSH, SH, FH, Medications and allergies.     PAST MEDICAL HISTORY:  Past Medical History:   Diagnosis Date    Mitral valve prolapse     \"cardiologist reports not any more\"       PAST SURGICAL HISTORY:  Past Surgical History:   Procedure Laterality Date     SECTION  79&83    x2    COLONOSCOPY      ROTATOR CUFF REPAIR Right        FAMILY HISTORY:  Family History   Problem Relation Age of Onset    Hypertension Mother     Hyperlipidemia Mother     Melanoma Mother     No Known Problems Daughter     No Known Problems Son     Varicose Veins Maternal Grandmother     Ovarian cancer Paternal Aunt 96    Breast cancer Neg Hx     Cervical cancer Neg Hx     Colon cancer Neg Hx     Endometrial cancer Neg Hx     Uterine cancer Neg Hx        SOCIAL HISTORY:  Social History     Tobacco Use    Smoking status: Never    Smokeless tobacco: Never   Vaping Use    Vaping status: Never Used   Substance Use Topics    Alcohol use: No    Drug use: No       MEDICATIONS:    Current Outpatient Medications:     Multiple Vitamin (MULTIVITAMIN) tablet, Take 1 tablet by mouth daily, Disp: , Rfl:     Omega-3 Fatty Acids (FISH OIL OMEGA-3) 1000 MG CAPS, Take 1 g by mouth daily, " Disp: , Rfl:     Red Yeast Rice 600 MG CAPS, Take 600 mg by mouth, Disp: , Rfl:     BABY ASPIRIN PO, Take by mouth every 24 hours PT STATES TAKES BABY ASA EVERY OTHER DAY, Disp: , Rfl:     Flarex 0.1 % ophthalmic suspension, , Disp: , Rfl:     metoprolol succinate (TOPROL-XL) 25 mg 24 hr tablet, Take 25 mg by mouth, Disp: , Rfl:     ALLERGIES:  Allergies   Allergen Reactions    Statins      DIZZY    Sulfa Antibiotics     Ezetimibe GI Intolerance       REVIEW OF SYSTEMS:  Review of Systems   Constitutional:  Negative for chills, fever and unexpected weight change.   HENT:  Negative for hearing loss, nosebleeds and sore throat.    Eyes:  Negative for pain, redness and visual disturbance.   Respiratory:  Negative for cough, shortness of breath and wheezing.    Cardiovascular:  Negative for chest pain, palpitations and leg swelling.   Gastrointestinal:  Negative for abdominal pain, nausea and vomiting.   Endocrine: Negative for polydipsia and polyuria.   Genitourinary:  Negative for difficulty urinating and hematuria.   Musculoskeletal:  Negative for arthralgias, joint swelling and myalgias.   Skin:  Negative for rash and wound.   Neurological:  Negative for dizziness, numbness and headaches.   Psychiatric/Behavioral:  Negative for decreased concentration, dysphoric mood and suicidal ideas. The patient is not nervous/anxious.        VITALS:  There were no vitals filed for this visit.      _____________________________________________________  PHYSICAL EXAMINATION:  General: well developed and well nourished, alert, oriented times 3, and appears comfortable  Psychiatric: Normal  HEENT: Normocephalic, Atraumatic Trachea Midline, No torticollis  Pulmonary: No audible wheezing or respiratory distress   Cardiovascular: No pitting edema, 2+ radial pulse   Abdominal/GI: abdomen non tender, non distended   Skin: No masses, erythema, lacerations, fluctation, ulcerations  Neurovascular: Sensation Intact to the Median, Ulnar,  "Radial Nerve, Motor Intact to the Median, Ulnar, Radial Nerve, and Pulses Intact  Musculoskeletal: Normal, except as noted in detailed exam and in HPI.      MUSCULOSKELETAL EXAMINATION:    Right hand:    No erythema, ecchymosis or edema  Full digital extension   Full composite fist   Ring finger does sit more abducted secondary to the fracture     ___________________________________________________    STUDIES REVIEWED:  I have personally reviewed and interpreted  AP lateral and oblique radiographs of right hand which demonstrate progressive healing of fourth metacarpal neck fracture and nondisplaced fifth metacarpal base fracture        LABS REVIEWED:    HgA1c: No results found for: \"HGBA1C\"  BMP:   Lab Results   Component Value Date    GLUCOSE 86 06/20/2014    CALCIUM 9.0 10/23/2023     06/20/2014    K 4.8 10/23/2023    CO2 26 10/23/2023     10/23/2023    BUN 17 10/23/2023    CREATININE 0.88 10/23/2023             PROCEDURES PERFORMED:  Procedures  No Procedures performed today    _____________________________________________________      Scribe Attestation      I,:  Rufina Malone MA am acting as a scribe while in the presence of the attending physician.:       I,:  Lulu Ruffin MD personally performed the services described in this documentation    as scribed in my presence.:                 "

## 2025-04-21 ENCOUNTER — OFFICE VISIT (OUTPATIENT)
Dept: OCCUPATIONAL THERAPY | Facility: CLINIC | Age: 75
End: 2025-04-21
Payer: COMMERCIAL

## 2025-04-21 DIAGNOSIS — S62.334D CLOSED DISPLACED FRACTURE OF NECK OF FOURTH METACARPAL BONE OF RIGHT HAND WITH ROUTINE HEALING, SUBSEQUENT ENCOUNTER: Primary | ICD-10-CM

## 2025-04-21 DIAGNOSIS — S62.346D CLOSED NONDISPLACED FRACTURE OF BASE OF FIFTH METACARPAL BONE OF RIGHT HAND WITH ROUTINE HEALING, SUBSEQUENT ENCOUNTER: ICD-10-CM

## 2025-04-21 PROCEDURE — 97140 MANUAL THERAPY 1/> REGIONS: CPT

## 2025-04-21 PROCEDURE — 97110 THERAPEUTIC EXERCISES: CPT

## 2025-04-21 NOTE — PROGRESS NOTES
"Daily Note     Today's date: 2025  Patient name: Barbara Cohen  : 1950  MRN: [id number]  Referring provider: Lulu Ruffin MD  Dx:   Encounter Diagnosis     ICD-10-CM    1. Closed displaced fracture of neck of fourth metacarpal bone of right hand with routine healing, subsequent encounter  S62.334D       2. Closed nondisplaced fracture of base of fifth metacarpal bone of right hand with routine healing, subsequent encounter  S62.346D                      Subjective: \"I'm good to go!\".       Objective: See treatment diary below      Assessment: Tolerated treatment well. Patient can continue to form a full composite fist. Patient was cleared to begin resistive exercises and return to activities with no restrictions. Therapist updated patient's HEP to include putty exercises       Plan: Continue per plan of care.  Progress treatment as tolerated.       Precautions: 4th and 5th Metacarpal Fx  DOI: 25      Manuals 3/31 4/07 4/14 4/21         STM   8 8                                                Neuro Re-Ed                                                                                                        Ther Ex             HEP Digit AROM    Tendon glides    Abduction Digit   Putty         Digit AROM>AAROM>PROM             Dex balls  2' 2' 2'         Pencil grasps  x1 x1 x1         Pencil push ups  x20 x30 x30         Hook fist pegs  x5 x5 x5         Abduction pegs  x5 x5          Reverse blocking             Tabletop extension  x20 x20          Digiflex    R iso x10    G comp x20         Ext web    Y x 20         Power web    R center and rim x20                                                Ther Activity             Keypegs  X1 translation  X1 Translation  x1         Pinch ring    R/R x1         Putty item find    10 items                                   Modalities             P  5' 5' 5'                           "

## 2025-04-22 ENCOUNTER — ANESTHESIA (OUTPATIENT)
Dept: ANESTHESIOLOGY | Facility: HOSPITAL | Age: 75
End: 2025-04-22

## 2025-04-22 ENCOUNTER — ANESTHESIA EVENT (OUTPATIENT)
Dept: ANESTHESIOLOGY | Facility: HOSPITAL | Age: 75
End: 2025-04-22

## 2025-04-28 ENCOUNTER — APPOINTMENT (OUTPATIENT)
Dept: OCCUPATIONAL THERAPY | Facility: CLINIC | Age: 75
End: 2025-04-28
Payer: COMMERCIAL

## 2025-04-30 ENCOUNTER — OFFICE VISIT (OUTPATIENT)
Dept: OCCUPATIONAL THERAPY | Facility: CLINIC | Age: 75
End: 2025-04-30
Payer: COMMERCIAL

## 2025-04-30 DIAGNOSIS — S62.346D CLOSED NONDISPLACED FRACTURE OF BASE OF FIFTH METACARPAL BONE OF RIGHT HAND WITH ROUTINE HEALING, SUBSEQUENT ENCOUNTER: ICD-10-CM

## 2025-04-30 DIAGNOSIS — S62.334D CLOSED DISPLACED FRACTURE OF NECK OF FOURTH METACARPAL BONE OF RIGHT HAND WITH ROUTINE HEALING, SUBSEQUENT ENCOUNTER: Primary | ICD-10-CM

## 2025-04-30 PROCEDURE — 97140 MANUAL THERAPY 1/> REGIONS: CPT

## 2025-04-30 PROCEDURE — 97110 THERAPEUTIC EXERCISES: CPT

## 2025-04-30 NOTE — PROGRESS NOTES
"Daily Note     Today's date: 2025  Patient name: Barbara Cohen  : 1950  MRN: [id number]  Referring provider: Lulu Ruffin MD  Dx:   Encounter Diagnosis     ICD-10-CM    1. Closed displaced fracture of neck of fourth metacarpal bone of right hand with routine healing, subsequent encounter  S62.334D       2. Closed nondisplaced fracture of base of fifth metacarpal bone of right hand with routine healing, subsequent encounter  S62.346D                      Subjective: \"I'm very happy with ut\".       Objective: See treatment diary below      Assessment: Tolerated treatment well. Patient can continue to form a full composite fist. Patient has returned to all functional activities, Patient and therapist discussed discharge to final [facility]. Patient discharged.       Plan: Continue per plan of care.  Progress treatment as tolerated.       Precautions: 4th and 5th Metacarpal Fx  DOI: 25      Manuals 3/31 4/07 4/14 4/21 4/30        STM   8 8 8'                                               Neuro Re-Ed                                                                                                        Ther Ex             HEP Digit AROM    Tendon glides    Abduction Digit   Putty         Digit AROM>AAROM>PROM             Dex balls  2' 2' 2' 2X        Pencil grasps  x1 x1 x1         Pencil push ups  x20 x30 x30 X30        Hook fist pegs  x5 x5 x5 5        Abduction pegs  x5 x5          Reverse blocking             Tabletop extension  x20 x20          Digiflex    R iso x10    G comp x20 R iso x10    G comp x20        Ext web    Y x 20 Y x 20        Power web    R center and rim x20 R center and rim x20                                               Ther Activity             Keypegs  X1 translation  X1 Translation  x1 x1        Pinch ring    R/R x1 R/R x1        Putty item find    10 items 10 items                                  Modalities             [facility]  5' 5' 5' 5'                          "

## 2025-05-06 ENCOUNTER — ANESTHESIA EVENT (OUTPATIENT)
Dept: GASTROENTEROLOGY | Facility: AMBULARY SURGERY CENTER | Age: 75
End: 2025-05-06
Payer: COMMERCIAL

## 2025-05-06 ENCOUNTER — HOSPITAL ENCOUNTER (OUTPATIENT)
Dept: GASTROENTEROLOGY | Facility: AMBULARY SURGERY CENTER | Age: 75
Setting detail: OUTPATIENT SURGERY
Discharge: HOME | End: 2025-05-06
Attending: INTERNAL MEDICINE
Payer: COMMERCIAL

## 2025-05-06 VITALS
DIASTOLIC BLOOD PRESSURE: 66 MMHG | RESPIRATION RATE: 22 BRPM | OXYGEN SATURATION: 97 % | WEIGHT: 136 LBS | BODY MASS INDEX: 25.68 KG/M2 | HEART RATE: 64 BPM | HEIGHT: 61 IN | SYSTOLIC BLOOD PRESSURE: 108 MMHG | TEMPERATURE: 97.6 F

## 2025-05-06 DIAGNOSIS — Z12.11 SCREENING FOR COLON CANCER: ICD-10-CM

## 2025-05-06 PROCEDURE — 45380 COLONOSCOPY AND BIOPSY: CPT | Performed by: INTERNAL MEDICINE

## 2025-05-06 PROCEDURE — 45385 COLONOSCOPY W/LESION REMOVAL: CPT | Performed by: INTERNAL MEDICINE

## 2025-05-06 PROCEDURE — 88305 TISSUE EXAM BY PATHOLOGIST: CPT | Performed by: PATHOLOGY

## 2025-05-06 RX ORDER — PROPOFOL 10 MG/ML
INJECTION, EMULSION INTRAVENOUS AS NEEDED
Status: DISCONTINUED | OUTPATIENT
Start: 2025-05-06 | End: 2025-05-06

## 2025-05-06 RX ORDER — LIDOCAINE HYDROCHLORIDE 10 MG/ML
INJECTION, SOLUTION EPIDURAL; INFILTRATION; INTRACAUDAL; PERINEURAL AS NEEDED
Status: DISCONTINUED | OUTPATIENT
Start: 2025-05-06 | End: 2025-05-06

## 2025-05-06 RX ORDER — SODIUM CHLORIDE, SODIUM LACTATE, POTASSIUM CHLORIDE, CALCIUM CHLORIDE 600; 310; 30; 20 MG/100ML; MG/100ML; MG/100ML; MG/100ML
INJECTION, SOLUTION INTRAVENOUS CONTINUOUS PRN
Status: DISCONTINUED | OUTPATIENT
Start: 2025-05-06 | End: 2025-05-06

## 2025-05-06 RX ADMIN — PROPOFOL 30 MG: 10 INJECTION, EMULSION INTRAVENOUS at 08:33

## 2025-05-06 RX ADMIN — PROPOFOL 20 MG: 10 INJECTION, EMULSION INTRAVENOUS at 08:35

## 2025-05-06 RX ADMIN — PROPOFOL 20 MG: 10 INJECTION, EMULSION INTRAVENOUS at 08:47

## 2025-05-06 RX ADMIN — LIDOCAINE HYDROCHLORIDE 50 MG: 10 INJECTION, SOLUTION EPIDURAL; INFILTRATION; INTRACAUDAL at 08:32

## 2025-05-06 RX ADMIN — SODIUM CHLORIDE, SODIUM LACTATE, POTASSIUM CHLORIDE, AND CALCIUM CHLORIDE: .6; .31; .03; .02 INJECTION, SOLUTION INTRAVENOUS at 08:29

## 2025-05-06 RX ADMIN — PROPOFOL 20 MG: 10 INJECTION, EMULSION INTRAVENOUS at 08:44

## 2025-05-06 RX ADMIN — PROPOFOL 20 MG: 10 INJECTION, EMULSION INTRAVENOUS at 08:40

## 2025-05-06 RX ADMIN — PROPOFOL 100 MG: 10 INJECTION, EMULSION INTRAVENOUS at 08:32

## 2025-05-06 RX ADMIN — PROPOFOL 20 MG: 10 INJECTION, EMULSION INTRAVENOUS at 08:37

## 2025-05-06 NOTE — ANESTHESIA POSTPROCEDURE EVALUATION
Post-Op Assessment Note    CV Status:  Stable  Pain Score: 0    Pain management: adequate       Mental Status:  Arousable   Hydration Status:  Stable and euvolemic   PONV Controlled:  None   Airway Patency:  Patent     Post Op Vitals Reviewed: Yes    No anethesia notable event occurred.    Staff: CRNA           Last Filed PACU Vitals:  Vitals Value Taken Time   Temp     Pulse 74    /68    Resp 15    SpO2 99

## 2025-05-06 NOTE — H&P
"History and Physical -  [facility]  Barbara Cohen 74 y.o. female MRN: [id number]    HPI: Barbara Cohen is a 74 y.o. year old female who presents for colon cancer screening evaluation.      Review of Systems    Historical Information   Past Medical History:   Diagnosis Date    Mitral valve prolapse     \"cardiologist reports not any more\"     Past Surgical History:   Procedure Laterality Date     SECTION  79&83    x2    COLONOSCOPY      ROTATOR CUFF REPAIR Right      Social History   Social History     Substance and Sexual Activity   Alcohol Use No     Social History     Substance and Sexual Activity   Drug Use No     Social History     Tobacco Use   Smoking Status Never   Smokeless Tobacco Never     Family History   Problem Relation Age of Onset    Hypertension Mother     Hyperlipidemia Mother     Melanoma Mother     No Known Problems Daughter     No Known Problems Son     Varicose Veins Maternal Grandmother     Ovarian cancer Paternal Aunt 96    Breast cancer Neg Hx     Cervical cancer Neg Hx     Colon cancer Neg Hx     Endometrial cancer Neg Hx     Uterine cancer Neg Hx        Meds/Allergies     Not in a hospital admission.    Allergies   Allergen Reactions    Statins      DIZZY    Sulfa Antibiotics     Ezetimibe GI Intolerance       Objective     /63   Pulse 62   Temp (!) 97 °F (36.1 °C) (Temporal)   Resp 16   Ht 5' 1\" (1.549 m)   Wt 61.7 kg (136 lb)   LMP  (LMP Unknown)   SpO2 98%   BMI 25.70 kg/m²       PHYSICAL EXAM    Gen: NAD  CV: RRR  CHEST: Clear  ABD: soft, NT/ND  EXT: no edema  Neuro: AAO      ASSESSMENT/PLAN:  This is a 74 y.o. year old female here for colon cancer screening evaluation.  Patient was explained about the risks and benefits of the procedure. Risks including but not limited to bleeding, infection, perforation were explained in detail.       PLAN:   Procedure: Colonoscopy.      "

## 2025-05-06 NOTE — ANESTHESIA PREPROCEDURE EVALUATION
Procedure:  COLONOSCOPY    Relevant Problems   CARDIO   (+) Chest pain   (+) Familial hypercholesterolemia   (+) HTN (hypertension)   (+) Symptomatic varicose veins of both lower extremities   (+) Venous insufficiency (chronic) (peripheral)      NPO 345am prep 5/6    Physical Exam    Airway    Mallampati score: II  TM Distance: >3 FB  Neck ROM: full     Dental       Cardiovascular  Cardiovascular exam normal    Pulmonary  Pulmonary exam normal     Other Findings  post-pubertal.      Anesthesia Plan  ASA Score- 2     Anesthesia Type- IV sedation with anesthesia with ASA Monitors.         Additional Monitors:     Airway Plan:            Plan Factors-Exercise tolerance (METS): >4 METS.    Chart reviewed. EKG reviewed.  Existing labs reviewed. Patient summary reviewed.          Obstructive sleep apnea risk education given perioperatively.        Induction- intravenous.    Postoperative Plan-     Perioperative Resuscitation Plan - Level 1 - Full Code.       Informed Consent- Anesthetic plan and risks discussed with patient.  I personally reviewed this patient with the CRNA. Discussed and agreed on the Anesthesia Plan with the CRNA..      NPO Status:  Vitals Value Taken Time   Date of last liquid 05/06/25 05/06/25 0757   Time of last liquid 0545 05/06/25 0757   Date of last solid 05/04/25 05/06/25 0757   Time of last solid 1800 05/06/25 0757

## 2025-05-09 PROCEDURE — 88305 TISSUE EXAM BY PATHOLOGIST: CPT | Performed by: PATHOLOGY

## 2025-05-13 ENCOUNTER — RESULTS FOLLOW-UP (OUTPATIENT)
Dept: GASTROENTEROLOGY | Facility: AMBULARY SURGERY CENTER | Age: 75
End: 2025-05-13

## 2025-05-14 NOTE — TELEPHONE ENCOUNTER
Attempted to call pt regarding colonoscopy results & provider recommendations, however, I received voicemail. Advised to return call to office to discuss. 5 year colon recall set.     Please, relay results if pt returns call. Thanks!

## 2025-05-14 NOTE — TELEPHONE ENCOUNTER
----- Message from Bela Zapata MD sent at 5/13/2025  8:46 AM EDT -----  Repeat colonoscopy in 5 years.